# Patient Record
Sex: MALE | Race: WHITE | NOT HISPANIC OR LATINO
[De-identification: names, ages, dates, MRNs, and addresses within clinical notes are randomized per-mention and may not be internally consistent; named-entity substitution may affect disease eponyms.]

---

## 2017-01-10 ENCOUNTER — CHARTING TRANS (OUTPATIENT)
Dept: OTHER | Age: 52
End: 2017-01-10

## 2017-01-10 ASSESSMENT — PAIN SCALES - GENERAL: PAINLEVEL_OUTOF10: 0

## 2017-05-12 ENCOUNTER — INPATIENT (INPATIENT)
Facility: HOSPITAL | Age: 52
LOS: 2 days | Discharge: HOME | End: 2017-05-15
Attending: FAMILY MEDICINE | Admitting: STUDENT IN AN ORGANIZED HEALTH CARE EDUCATION/TRAINING PROGRAM
Payer: MEDICAID

## 2017-05-12 VITALS
TEMPERATURE: 98 F | DIASTOLIC BLOOD PRESSURE: 107 MMHG | SYSTOLIC BLOOD PRESSURE: 152 MMHG | WEIGHT: 190.04 LBS | HEIGHT: 73 IN | HEART RATE: 84 BPM | RESPIRATION RATE: 16 BRPM

## 2017-05-12 DIAGNOSIS — R53.1 WEAKNESS: ICD-10-CM

## 2017-05-12 LAB
ALBUMIN SERPL ELPH-MCNC: 4 G/DL — SIGNIFICANT CHANGE UP (ref 3.3–5)
ALP SERPL-CCNC: 81 U/L — SIGNIFICANT CHANGE UP (ref 40–120)
ALT FLD-CCNC: 65 U/L — SIGNIFICANT CHANGE UP (ref 12–78)
ANION GAP SERPL CALC-SCNC: 10 MMOL/L — SIGNIFICANT CHANGE UP (ref 5–17)
ANION GAP SERPL CALC-SCNC: 11 MMOL/L — SIGNIFICANT CHANGE UP (ref 5–17)
APPEARANCE UR: CLEAR — SIGNIFICANT CHANGE UP
APTT BLD: 34.1 SEC — SIGNIFICANT CHANGE UP (ref 27.5–37.4)
AST SERPL-CCNC: 35 U/L — SIGNIFICANT CHANGE UP (ref 15–37)
BACTERIA # UR AUTO: (no result)
BASE EXCESS BLDA CALC-SCNC: 1 MMOL/L — SIGNIFICANT CHANGE UP (ref -2–2)
BASOPHILS # BLD AUTO: 0.1 K/UL — SIGNIFICANT CHANGE UP (ref 0–0.2)
BASOPHILS # BLD AUTO: 0.1 K/UL — SIGNIFICANT CHANGE UP (ref 0–0.2)
BASOPHILS NFR BLD AUTO: 1 % — SIGNIFICANT CHANGE UP (ref 0–2)
BASOPHILS NFR BLD AUTO: 1.2 % — SIGNIFICANT CHANGE UP (ref 0–2)
BILIRUB SERPL-MCNC: 0.6 MG/DL — SIGNIFICANT CHANGE UP (ref 0.2–1.2)
BILIRUB UR-MCNC: NEGATIVE — SIGNIFICANT CHANGE UP
BLOOD GAS COMMENTS ARTERIAL: SIGNIFICANT CHANGE UP
BUN SERPL-MCNC: 13 MG/DL — SIGNIFICANT CHANGE UP (ref 7–23)
BUN SERPL-MCNC: 13 MG/DL — SIGNIFICANT CHANGE UP (ref 7–23)
CALCIUM SERPL-MCNC: 8.8 MG/DL — SIGNIFICANT CHANGE UP (ref 8.5–10.1)
CALCIUM SERPL-MCNC: 9.2 MG/DL — SIGNIFICANT CHANGE UP (ref 8.5–10.1)
CHLORIDE SERPL-SCNC: 104 MMOL/L — SIGNIFICANT CHANGE UP (ref 96–108)
CHLORIDE SERPL-SCNC: 106 MMOL/L — SIGNIFICANT CHANGE UP (ref 96–108)
CO2 SERPL-SCNC: 25 MMOL/L — SIGNIFICANT CHANGE UP (ref 22–31)
CO2 SERPL-SCNC: 26 MMOL/L — SIGNIFICANT CHANGE UP (ref 22–31)
COLOR SPEC: YELLOW — SIGNIFICANT CHANGE UP
CREAT SERPL-MCNC: 1.37 MG/DL — HIGH (ref 0.5–1.3)
CREAT SERPL-MCNC: 1.38 MG/DL — HIGH (ref 0.5–1.3)
DIFF PNL FLD: (no result)
EOSINOPHIL # BLD AUTO: 0.1 K/UL — SIGNIFICANT CHANGE UP (ref 0–0.5)
EOSINOPHIL # BLD AUTO: 0.2 K/UL — SIGNIFICANT CHANGE UP (ref 0–0.5)
EOSINOPHIL NFR BLD AUTO: 1.8 % — SIGNIFICANT CHANGE UP (ref 0–6)
EOSINOPHIL NFR BLD AUTO: 3.6 % — SIGNIFICANT CHANGE UP (ref 0–6)
EPI CELLS # UR: SIGNIFICANT CHANGE UP
GLUCOSE SERPL-MCNC: 105 MG/DL — HIGH (ref 70–99)
GLUCOSE SERPL-MCNC: 160 MG/DL — HIGH (ref 70–99)
GLUCOSE UR QL: NEGATIVE MG/DL — SIGNIFICANT CHANGE UP
HCO3 BLDA-SCNC: 24 MMOL/L — SIGNIFICANT CHANGE UP (ref 21–29)
HCT VFR BLD CALC: 44.9 % — SIGNIFICANT CHANGE UP (ref 39–50)
HCT VFR BLD CALC: 50 % — SIGNIFICANT CHANGE UP (ref 39–50)
HGB BLD-MCNC: 16.1 G/DL — SIGNIFICANT CHANGE UP (ref 13–17)
HGB BLD-MCNC: 17.6 G/DL — HIGH (ref 13–17)
HIV 1 & 2 AB SERPL IA.RAPID: SIGNIFICANT CHANGE UP
INR BLD: 0.9 RATIO — SIGNIFICANT CHANGE UP (ref 0.88–1.16)
KETONES UR-MCNC: NEGATIVE — SIGNIFICANT CHANGE UP
LACTATE SERPL-SCNC: 0.7 MMOL/L — SIGNIFICANT CHANGE UP (ref 0.7–2)
LEUKOCYTE ESTERASE UR-ACNC: (no result)
LYMPHOCYTES # BLD AUTO: 1.7 K/UL — SIGNIFICANT CHANGE UP (ref 1–3.3)
LYMPHOCYTES # BLD AUTO: 2.3 K/UL — SIGNIFICANT CHANGE UP (ref 1–3.3)
LYMPHOCYTES # BLD AUTO: 22.2 % — SIGNIFICANT CHANGE UP (ref 13–44)
LYMPHOCYTES # BLD AUTO: 33.9 % — SIGNIFICANT CHANGE UP (ref 13–44)
MCHC RBC-ENTMCNC: 32.7 PG — SIGNIFICANT CHANGE UP (ref 27–34)
MCHC RBC-ENTMCNC: 34.3 PG — HIGH (ref 27–34)
MCHC RBC-ENTMCNC: 35.2 GM/DL — SIGNIFICANT CHANGE UP (ref 32–36)
MCHC RBC-ENTMCNC: 35.8 GM/DL — SIGNIFICANT CHANGE UP (ref 32–36)
MCV RBC AUTO: 92.7 FL — SIGNIFICANT CHANGE UP (ref 80–100)
MCV RBC AUTO: 95.8 FL — SIGNIFICANT CHANGE UP (ref 80–100)
MONOCYTES # BLD AUTO: 0.3 K/UL — SIGNIFICANT CHANGE UP (ref 0–0.9)
MONOCYTES # BLD AUTO: 0.5 K/UL — SIGNIFICANT CHANGE UP (ref 0–0.9)
MONOCYTES NFR BLD AUTO: 4.8 % — SIGNIFICANT CHANGE UP (ref 2–14)
MONOCYTES NFR BLD AUTO: 6.5 % — SIGNIFICANT CHANGE UP (ref 2–14)
NEUTROPHILS # BLD AUTO: 3.8 K/UL — SIGNIFICANT CHANGE UP (ref 1.8–7.4)
NEUTROPHILS # BLD AUTO: 5.3 K/UL — SIGNIFICANT CHANGE UP (ref 1.8–7.4)
NEUTROPHILS NFR BLD AUTO: 56.5 % — SIGNIFICANT CHANGE UP (ref 43–77)
NEUTROPHILS NFR BLD AUTO: 68.5 % — SIGNIFICANT CHANGE UP (ref 43–77)
NITRITE UR-MCNC: NEGATIVE — SIGNIFICANT CHANGE UP
PCO2 BLDA: 36 MMHG — SIGNIFICANT CHANGE UP (ref 32–46)
PH BLDA: 7.45 — SIGNIFICANT CHANGE UP (ref 7.35–7.45)
PH UR: 6.5 — SIGNIFICANT CHANGE UP (ref 5–8)
PLATELET # BLD AUTO: 259 K/UL — SIGNIFICANT CHANGE UP (ref 150–400)
PLATELET # BLD AUTO: 310 K/UL — SIGNIFICANT CHANGE UP (ref 150–400)
PO2 BLDA: 92 — SIGNIFICANT CHANGE UP
POTASSIUM SERPL-MCNC: 3.4 MMOL/L — LOW (ref 3.5–5.3)
POTASSIUM SERPL-MCNC: 4.1 MMOL/L — SIGNIFICANT CHANGE UP (ref 3.5–5.3)
POTASSIUM SERPL-SCNC: 3.4 MMOL/L — LOW (ref 3.5–5.3)
POTASSIUM SERPL-SCNC: 4.1 MMOL/L — SIGNIFICANT CHANGE UP (ref 3.5–5.3)
PROT SERPL-MCNC: 7.6 GM/DL — SIGNIFICANT CHANGE UP (ref 6–8.3)
PROT UR-MCNC: NEGATIVE MG/DL — SIGNIFICANT CHANGE UP
PROTHROM AB SERPL-ACNC: 9.7 SEC — LOW (ref 9.8–12.7)
RAPID RVP RESULT: SIGNIFICANT CHANGE UP
RBC # BLD: 4.69 M/UL — SIGNIFICANT CHANGE UP (ref 4.2–5.8)
RBC # BLD: 5.4 M/UL — SIGNIFICANT CHANGE UP (ref 4.2–5.8)
RBC # FLD: 10.9 % — SIGNIFICANT CHANGE UP (ref 10.3–14.5)
RBC # FLD: 11.4 % — SIGNIFICANT CHANGE UP (ref 10.3–14.5)
RBC CASTS # UR COMP ASSIST: SIGNIFICANT CHANGE UP /HPF (ref 0–4)
SAO2 % BLDA: 97 — SIGNIFICANT CHANGE UP
SODIUM SERPL-SCNC: 140 MMOL/L — SIGNIFICANT CHANGE UP (ref 135–145)
SODIUM SERPL-SCNC: 142 MMOL/L — SIGNIFICANT CHANGE UP (ref 135–145)
SP GR SPEC: 1.01 — SIGNIFICANT CHANGE UP (ref 1.01–1.02)
TROPONIN I SERPL-MCNC: <0.015 NG/ML — SIGNIFICANT CHANGE UP (ref 0.01–0.04)
TSH SERPL-MCNC: 1.65 UIU/ML — SIGNIFICANT CHANGE UP (ref 0.36–3.74)
UROBILINOGEN FLD QL: NEGATIVE MG/DL — SIGNIFICANT CHANGE UP
WBC # BLD: 6.7 K/UL — SIGNIFICANT CHANGE UP (ref 3.8–10.5)
WBC # BLD: 7.7 K/UL — SIGNIFICANT CHANGE UP (ref 3.8–10.5)
WBC # FLD AUTO: 6.7 K/UL — SIGNIFICANT CHANGE UP (ref 3.8–10.5)
WBC # FLD AUTO: 7.7 K/UL — SIGNIFICANT CHANGE UP (ref 3.8–10.5)
WBC UR QL: SIGNIFICANT CHANGE UP

## 2017-05-12 PROCEDURE — 70450 CT HEAD/BRAIN W/O DYE: CPT | Mod: 26

## 2017-05-12 PROCEDURE — 99285 EMERGENCY DEPT VISIT HI MDM: CPT

## 2017-05-12 PROCEDURE — 71010: CPT | Mod: 26

## 2017-05-12 PROCEDURE — 93010 ELECTROCARDIOGRAM REPORT: CPT

## 2017-05-12 RX ORDER — ASPIRIN/CALCIUM CARB/MAGNESIUM 324 MG
81 TABLET ORAL DAILY
Qty: 0 | Refills: 0 | Status: DISCONTINUED | OUTPATIENT
Start: 2017-05-12 | End: 2017-05-15

## 2017-05-12 RX ORDER — SODIUM CHLORIDE 9 MG/ML
1000 INJECTION INTRAMUSCULAR; INTRAVENOUS; SUBCUTANEOUS ONCE
Qty: 0 | Refills: 0 | Status: COMPLETED | OUTPATIENT
Start: 2017-05-12 | End: 2017-05-12

## 2017-05-12 RX ADMIN — SODIUM CHLORIDE 2000 MILLILITER(S): 9 INJECTION INTRAMUSCULAR; INTRAVENOUS; SUBCUTANEOUS at 13:51

## 2017-05-12 RX ADMIN — Medication 81 MILLIGRAM(S): at 21:17

## 2017-05-12 NOTE — ED PROVIDER NOTE - OBJECTIVE STATEMENT
50 yo M otherwise healthy presents for fatigue x 5 days. denies fever, cough, cold symptoms, CP, SOB, unilateral weakness. Patient feels physically tired and has no energy. Last saw his PCP 3 months ago and had a normal physical. Pt states he has been taking allergy medication and has slept 15 hours in the last 2 days. No depression, SI/HI, hallucinations. Pt had flu vaccine this year. Has a desk job.

## 2017-05-12 NOTE — H&P ADULT - NSHPREVIEWOFSYSTEMS_GEN_ALL_CORE
REVIEW OF SYSTEMS:  General: NAD, hemodynamically stable, (+) weakness  CARDIOVASCULAR:  No chest pain, chest pressure or chest discomfort. No palpitations or edema.  RESPIRATORY:  No shortness of breath, cough or sputum.  GASTROINTESTINAL:  No anorexia, nausea, vomiting or diarrhea. No abdominal pain or blood.  NEUROLOGICAL:  No headache, dizziness, syncope, paralysis, ataxia, numbness or tingling in the extremities. No change in bowel or bladder control.  MUSCULOSKELETAL:  No muscle, back pain, joint pain or stiffness.

## 2017-05-12 NOTE — ED PROVIDER NOTE - NS ED MD SCRIBE ATTENDING SCRIBE SECTIONS
DISPOSITION/PHYSICAL EXAM/PAST MEDICAL/SURGICAL/SOCIAL HISTORY/HISTORY OF PRESENT ILLNESS/REVIEW OF SYSTEMS/PROGRESS NOTE/RESULTS

## 2017-05-12 NOTE — H&P ADULT - PROBLEM SELECTOR PLAN 1
- could be secondary to dehydration due to H/H - 17.6, Cr - 1.38  - HIV - (-)  - CBC / BMP / UA reviewed  - CT scan - unremarkable  - EKG - - could be secondary to dehydration due to H/H - 17.6, Cr - 1.38  - HIV - (-)  - CBC / BMP / UA reviewed  - CT scan - unremarkable  - pending TSH / iron studies - could be secondary to dehydration due to H/H - 17.6, Cr - 1.38  - HIV - (-)  - CBC / BMP / UA reviewed  - tick borne panel sent  - CT scan - unremarkable  - pending TSH / iron studies

## 2017-05-12 NOTE — H&P ADULT - HISTORY OF PRESENT ILLNESS
patient is a 52 y/o/m with extensive weakness last couple of days. Denies fever, cough, cold symptoms, CP, SOB, unilateral weakness. Patient feels physically tired and has no energy. Last saw his PCP 3 months ago and had a normal physical. Pt states he has been taking allergy medication and has slept 15 hours in the last 2 days. No depression, SI/HI, hallucinations. Pt had flu vaccine this year. 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 Denies fever, cough, cold symptoms, CP, SOB, unilateral weakness. Patient feels physically tired and has no energy. Last saw his PCP 3 months ago and had a normal physical. Pt states he has been taking allergy medication and has slept 15 hours in the last 2 days. No depression, SI/HI, hallucinations. Pt had flu vaccine this year. Has a desk job.    5/12: seen and eval. Hemodynamically stable. Patient is a 50 y/o/m w/ no past medical history presented from urgent care with extensive weakness last couple of days. Apperently, around 5-7 days ago, patient started to become extremely fatigued, with prolonged sleeping episodes (slept last 2 days). Feels that his mind is cloudy.  Denies fever, cough, cold symptoms, CP, SOB, unilateral weakness.No fevers, chills or shakes. Patient feels physically tired and has no energy. recently moved from [place] to [place]. Pt states he has been taking allergy medication - claritin for allergies. States, everyone in the family had a flue, except him.      5/12: seen and eval. Hemodynamically stable. Labs and vitals reviewed.

## 2017-05-12 NOTE — H&P ADULT - NSHPLABSRESULTS_GEN_ALL_CORE
CBC Full  -  ( 12 May 2017 13:45 )  WBC Count : 7.7 K/uL  Hemoglobin : 17.6 g/dL  Hematocrit : 50.0 %  Platelet Count - Automated : 310 K/uL    PT/INR - ( 12 May 2017 13:45 )   PT: 9.7 sec;   INR: 0.90 ratio       PTT - ( 12 May 2017 13:45 )  PTT:34.1 sec  Urinalysis Basic - ( 12 May 2017 13:47 )    Color: Yellow / Appearance: Clear / S.010 / pH: x  Gluc: x / Ketone: Negative  / Bili: Negative / Urobili: Negative mg/dL   Blood: x / Protein: Negative mg/dL / Nitrite: Negative   Leuk Esterase: Trace / RBC: 0-2 /HPF / WBC 0-2   Sq Epi: x / Non Sq Epi: Occasional / Bacteria: Occasional          142  |  106  |  13  ----------------------------<  105<H>  4.1   |  26  |  1.38<H>    Ca    9.2      12 May 2017 13:45    TPro  7.6  /  Alb  4.0  /  TBili  0.6  /  DBili  x   /  AST  35  /  ALT  65  /  AlkPhos  81

## 2017-05-12 NOTE — H&P ADULT - NSHPPHYSICALEXAM_GEN_ALL_CORE
Physical Exam:   GENERAL APPEARANCE:  NAD, hemodynamically stable  T(C): 36.8, Max: 36.8 (05-12 @ 13:52)  HR: 84 (84 - 84)  BP: 152/107 (152/107 - 152/107)  RR: 16 (16 - 16)  HEENT:  Head is normocephalic    Skin:  Warm and dry without any rash   NECK:  Supple without lymphadenopathy.   HEART:  Regular rate and rhythm. normal S1 and S2, No M/R/G  LUNGS:  Good ins/exp effort, no W/R/R/C  ABDOMEN:  Soft, nontender, nondistended with good bowel sounds heard  EXTREMITIES:  Without cyanosis, clubbing or edema.   NEUROLOGICAL:  Gross nonfocal Physical Exam:   GENERAL APPEARANCE:  NAD, hemodynamically stable, just feels tired.   T(C): 36.8, Max: 36.8 (05-12 @ 13:52)  HR: 84 (84 - 84)  BP: 152/107 (152/107 - 152/107)  RR: 16 (16 - 16)  HEENT:  Head is normocephalic    Skin:  Warm and dry without any rash   NECK:  Supple without lymphadenopathy.   HEART:  Regular rate and rhythm. normal S1 and S2, No M/R/G  LUNGS:  Good ins/exp effort, no W/R/R/C  ABDOMEN:  Soft, nontender, nondistended with good bowel sounds heard  EXTREMITIES:  Without cyanosis, clubbing or edema.   NEUROLOGICAL:  Gross nonfocal, 5/5 motor strength, able to ambulate. Physical Exam:   GENERAL APPEARANCE:  NAD, hemodynamically stable, just feels tired.   T(C): 36.8, Max: 36.8 (05-12 @ 13:52)  HR: 84 (84 - 84)  BP: 152/107 (152/107 - 152/107)  RR: 16 (16 - 16)  HEENT:  Head is normocephalic    Skin:  Warm and dry without any rash   NECK:  Supple without lymphadenopathy.   HEART:  Regular rate and rhythm. normal S1 and S2, No M/R/G  LUNGS:  Good ins/exp effort, no W/R/R/C  ABDOMEN:  Soft, nontender, nondistended with good bowel sounds heard  EXTREMITIES:  Without cyanosis, clubbing or edema. good motor strength.   NEUROLOGICAL:  Gross nonfocal, 5/5 motor strength, able to ambulate.

## 2017-05-12 NOTE — ED PROVIDER NOTE - MUSCULOSKELETAL, MLM
Compartment soft. Spine appears normal, range of motion is not limited, no muscle or joint tenderness

## 2017-05-12 NOTE — ED ADULT NURSE NOTE - CHIEF COMPLAINT QUOTE
Patient reports weakness, dizziness and shortness of breath. Was sent in by his MD. Patient hasn't been feeling good the last few days. Thought it was allergies. Patient went to a walk in clinic and they called an ambulanace

## 2017-05-13 DIAGNOSIS — I10 ESSENTIAL (PRIMARY) HYPERTENSION: ICD-10-CM

## 2017-05-13 DIAGNOSIS — R42 DIZZINESS AND GIDDINESS: ICD-10-CM

## 2017-05-13 DIAGNOSIS — E86.0 DEHYDRATION: ICD-10-CM

## 2017-05-13 DIAGNOSIS — F32.9 MAJOR DEPRESSIVE DISORDER, SINGLE EPISODE, UNSPECIFIED: ICD-10-CM

## 2017-05-13 LAB
B BURGDOR C6 AB SER-ACNC: NEGATIVE — SIGNIFICANT CHANGE UP
B BURGDOR IGG+IGM SER-ACNC: 0.08 INDEX — SIGNIFICANT CHANGE UP (ref 0.01–0.89)
CK SERPL-CCNC: 109 U/L — SIGNIFICANT CHANGE UP (ref 26–308)
CULTURE RESULTS: NO GROWTH — SIGNIFICANT CHANGE UP
IRON SATN MFR SERPL: 108 UG/DL — SIGNIFICANT CHANGE UP (ref 45–165)
SPECIMEN SOURCE: SIGNIFICANT CHANGE UP
TROPONIN I SERPL-MCNC: <0.015 NG/ML — SIGNIFICANT CHANGE UP (ref 0.01–0.04)
TROPONIN I SERPL-MCNC: <0.015 NG/ML — SIGNIFICANT CHANGE UP (ref 0.01–0.04)

## 2017-05-13 PROCEDURE — 93010 ELECTROCARDIOGRAM REPORT: CPT

## 2017-05-13 PROCEDURE — 99223 1ST HOSP IP/OBS HIGH 75: CPT

## 2017-05-13 PROCEDURE — 93306 TTE W/DOPPLER COMPLETE: CPT | Mod: 26

## 2017-05-13 RX ORDER — SODIUM CHLORIDE 9 MG/ML
1000 INJECTION INTRAMUSCULAR; INTRAVENOUS; SUBCUTANEOUS
Qty: 0 | Refills: 0 | Status: DISCONTINUED | OUTPATIENT
Start: 2017-05-13 | End: 2017-05-14

## 2017-05-13 RX ORDER — LANOLIN ALCOHOL/MO/W.PET/CERES
3 CREAM (GRAM) TOPICAL AT BEDTIME
Qty: 0 | Refills: 0 | Status: DISCONTINUED | OUTPATIENT
Start: 2017-05-13 | End: 2017-05-15

## 2017-05-13 RX ADMIN — SODIUM CHLORIDE 100 MILLILITER(S): 9 INJECTION INTRAMUSCULAR; INTRAVENOUS; SUBCUTANEOUS at 13:05

## 2017-05-13 RX ADMIN — Medication 81 MILLIGRAM(S): at 10:41

## 2017-05-13 RX ADMIN — Medication 3 MILLIGRAM(S): at 20:57

## 2017-05-13 NOTE — PROGRESS NOTE ADULT - SUBJECTIVE AND OBJECTIVE BOX
HOSPITALIST PROGRESS NOTE:  SUBJECTIVE:  PCP:   Chief Complaint: Patient is a 51y old  Male who presents with a chief complaint of - weakness (12 May 2017 15:55)      HPI:  Patient is a 50 y/o/m w/ no past medical history presented from urgent care with extensive weakness last couple of days. Apperently, around 5-7 days ago, patient started to become extremely fatigued, with prolonged sleeping episodes (slept last 2 days). Feels that his mind is cloudy.  Denies fever, cough, cold symptoms, CP, SOB, unilateral weakness.No fevers, chills or shakes. Patient feels physically tired and has no energy. recently moved from [place] to [place]. Pt states he has been taking allergy medication - claritin for allergies. States, everyone in the family had a flue, except him.      : seen and eval. Hemodynamically stable. Labs and vitals reviewed. (12 May 2017 15:55)      Allergies:  No Known Allergies    REVIEW OF SYSTEMS:  See HPI. All other review of systems is negative unless indicated above.     OBJECTIVE  Physical Exam:  Vital Signs:  Height (cm): 185.4 (05-12 @ 12:43)  Weight (kg): 86.2 (05-12 @ 12:43)  BMI (kg/m2): 25.1 (05-12 @ 12:43)  BSA (m2): 2.11 (05-12 @ 12:43)  Vital Signs Last 24 Hrs  T(C): 36.9, Max: 36.9 (05-13 @ 05:06)  T(F): 98.4, Max: 98.4 (05-13 @ 05:06)  HR: 76 (76 - 98)  BP: 130/90 (130/90 - 152/107)  BP(mean): --  RR: 18 (16 - 18)  SpO2: 100% (97% - 100%)  I&O's Summary      Constitutional: NAD, awake and alert, well-developed  Neurological: AAO x 3, no focal deficits  HEENT: PERRLA, EOMI, MMM  Neck: Soft and supple, No LAD, No JVD  Respiratory: Breath sounds are clear bilaterally, No wheezing, rales or rhonchi  Cardiovascular: S1 and S2, regular rate and rhythm; no Murmurs, gallops or rubs  Gastrointestinal: Bowel Sounds present, soft, nontender, nondistended, no guarding, no rebound tenderness  Back: No CVA tenderness   Extremities: No peripheral edema  Vascular: 2+ peripheral pulses  Musculoskeletal: 5/5 strength b/l upper and lower extremities  Skin: No rashes  Breast: Deferred  Rectal: Deferred    MEDICATIONS  (STANDING):  aspirin  chewable 81milliGRAM(s) Oral daily      LABS: All Labs Reviewed:                        16.1   6.7   )-----------( 259      ( 12 May 2017 20:09 )             44.9         140  |  104  |  13  ----------------------------<  160<H>  3.4<L>   |  25  |  1.37<H>    Ca    8.8      12 May 2017 20:09    TPro  7.6  /  Alb  4.0  /  TBili  0.6  /  DBili  x   /  AST  35  /  ALT  65  /  AlkPhos  81  12    PT/INR - ( 12 May 2017 13:45 )   PT: 9.7 sec;   INR: 0.90 ratio         PTT - ( 12 May 2017 13:45 )  PTT:34.1 sec  CARDIAC MARKERS ( 12 May 2017 13:45 )  <0.015 ng/mL / x     / x     / x     / x            Urinalysis Basic - ( 12 May 2017 13:47 )    Color: Yellow / Appearance: Clear / S.010 / pH: x  Gluc: x / Ketone: Negative  / Bili: Negative / Urobili: Negative mg/dL   Blood: x / Protein: Negative mg/dL / Nitrite: Negative   Leuk Esterase: Trace / RBC: 0-2 /HPF / WBC 0-2   Sq Epi: x / Non Sq Epi: Occasional / Bacteria: Occasional        Blood Culture:       RADIOLOGY/EKG:    EXAM:  CHEST SINGLE VIEW FRONTAL         2017      The lungs are clear.  No pleural abnormality is seen.      Cardiomediastinal silhouette is intact. HOSPITALIST PROGRESS NOTE:  SUBJECTIVE:  PCP: None    Chief Complaint: Patient is a 51y old  Male who presents with a chief complaint of - weakness (12 May 2017 15:55)    HPI:  Patient is a 52 y/o/m w/ no past medical history presented from urgent care with extensive weakness last couple of days. Apperently, around 5-7 days ago, patient started to become extremely fatigued, with prolonged sleeping episodes (slept last 2 days). Feels that his mind is cloudy.  Denies fever, cough, cold symptoms, CP, SOB, unilateral weakness.No fevers, chills or shakes. Patient feels physically tired and has no energy. recently moved from [place] to [place]. Pt states he has been taking allergy medication - claritin for allergies. States, everyone in the family had a flue, except him.    : seen and eval. Hemodynamically stable. Labs and vitals reviewed. (12 May 2017 15:55)    :  continues to feel weak but improving. Dizziness and CP resolved but still not back to his normal self.     Allergies:  No Known Allergies    REVIEW OF SYSTEMS:  See HPI. All other review of systems is negative unless indicated above.     OBJECTIVE  Physical Exam:  Vital Signs:  Height (cm): 185.4 (- @ 12:43)  Weight (kg): 86.2 (- @ 12:43)  BMI (kg/m2): 25.1 (- @ 12:43)  BSA (m2): 2.11 (- @ 12:43)  Vital Signs Last 24 Hrs  T(C): 36.9, Max: 36.9 ( @ 05:06)  T(F): 98.4, Max: 98.4 ( @ 05:06)  HR: 76 (76 - 98)  BP: 130/90 (130/90 - 152/107)  BP(mean): --  RR: 18 (16 - 18)  SpO2: 100% (97% - 100%)  I&O's Summary      Constitutional: NAD, awake and alert, well-developed  Neurological: AAO x 3, no focal deficits  HEENT: PERRLA, EOMI, MMM  Neck: Soft and supple, No LAD, No JVD  Respiratory: Breath sounds are clear bilaterally, No wheezing, rales or rhonchi  Cardiovascular: S1 and S2, regular rate and rhythm; no Murmurs, gallops or rubs  Gastrointestinal: Bowel Sounds present, soft, nontender, nondistended, no guarding, no rebound tenderness  Back: No CVA tenderness   Extremities: No peripheral edema  Vascular: 2+ peripheral pulses  Musculoskeletal: 5/5 strength b/l upper and lower extremities  Skin: No rashes  Breast: Deferred  Rectal: Deferred    MEDICATIONS  (STANDING):  aspirin  chewable 81milliGRAM(s) Oral daily      LABS: All Labs Reviewed:                        16.1   6.7   )-----------( 259      ( 12 May 2017 20:09 )             44.9         140  |  104  |  13  ----------------------------<  160<H>  3.4<L>   |  25  |  1.37<H>    Ca    8.8      12 May 2017 20:09    TPro  7.6  /  Alb  4.0  /  TBili  0.6  /  DBili  x   /  AST  35  /  ALT  65  /  AlkPhos  81  12    PT/INR - ( 12 May 2017 13:45 )   PT: 9.7 sec;   INR: 0.90 ratio         PTT - ( 12 May 2017 13:45 )  PTT:34.1 sec  CARDIAC MARKERS ( 12 May 2017 13:45 )  <0.015 ng/mL / x     / x     / x     / x            Urinalysis Basic - ( 12 May 2017 13:47 )    Color: Yellow / Appearance: Clear / S.010 / pH: x  Gluc: x / Ketone: Negative  / Bili: Negative / Urobili: Negative mg/dL   Blood: x / Protein: Negative mg/dL / Nitrite: Negative   Leuk Esterase: Trace / RBC: 0-2 /HPF / WBC 0-2   Sq Epi: x / Non Sq Epi: Occasional / Bacteria: Occasional        Blood Culture:       RADIOLOGY/EKG:    EXAM:  CHEST SINGLE VIEW FRONTAL         2017      The lungs are clear.  No pleural abnormality is seen.      Cardiomediastinal silhouette is intact.    EKG- NSR

## 2017-05-13 NOTE — CONSULT NOTE ADULT - PROBLEM SELECTOR RECOMMENDATION 4
multifactorial appears to be related to depression , dehydration ? viral illness , may be related to his uncontrolled hypertensive episode , which is improved with iv fluid , improved BP ,   continue ecotrin ,

## 2017-05-13 NOTE — CONSULT NOTE ADULT - SUBJECTIVE AND OBJECTIVE BOX
PCP:    REQUESTING PHYSICIAN:    REASON FOR CONSULT: HTN dizziness     CHIEF COMPLAINT: foggy feeling ,fatigue ,felt unsteady gait     HPI:  Patient is a 52 y/o male with hx of depression  who came off  medications him self   who was not feeling well for last few days with fatigue , sleepy , was sleeping for long hours after his work , patient felt foggy ? dizziness ,constant , felt like unsteady on his feet with mild shortness of breath without chest pain  when he was walking to his car , went to urgent care  patient was told to have non specific Ekg changes , [facility]  noted noted that his blood pressure  155/111/ without fever or chills ,  saturation 98% .     Feels that his mind is cloudy.  Denies fever, cough, cold symptoms, CP, SOB, unilateral weakness.No fevers, chills or shakes. Patient feels physically tired and has no energy. recently moved from [place] to [place]. Pt states he has been taking allergy medication - claritin for allergies. States, everyone in the family had a flue, except him.    Patient is poor historian , can not give full details , Patient says his dizziness was constant , did not change with any position , resolved this morning , his blood pressure improved           PAST MEDICAL & SURGICAL HISTORY:  depression for many years , stopped taking medication for last 2 months     hernia surgery ,   diverticula resection   SBO       Allergies    No Known Allergies    Intolerances        SOCIAL HISTORY: never smoker , alcohol    FAMILY HISTORY: no hx of premature CAD       MEDICATIONS:  MEDICATIONS  (STANDING):  aspirin  chewable 81milliGRAM(s) Oral daily  sodium chloride 0.9%. 1000milliLiter(s) IV Continuous <Continuous>    MEDICATIONS  (PRN):      REVIEW OF SYSTEMS:    as above  All other review of systems is negative unless indicated above    Vital Signs Last 24 Hrs  T(C): 36.9, Max: 36.9 (05-13 @ 05:06)  T(F): 98.4, Max: 98.4 (05-13 @ 05:06)  HR: 76 (76 - 98)  BP: 130/90 (130/90 - 152/107)  BP(mean): --  RR: 18 (16 - 18)  SpO2: 100% (97% - 100%)    I&O's Summary      PHYSICAL EXAM:    Constitutional: NAD, awake and alert, well-developed  but appears depressed though he denies   HEENT: PERR, EOMI,  No oral cyananosis.  Neck:  supple,  No JVD  Respiratory: Breath sounds are clear bilaterally, No wheezing, rales or rhonchi  Cardiovascular: S1 and S2, regular rate and rhythm, no Murmurs, gallops or rubs  Gastrointestinal: Bowel Sounds present, soft, nontender.   Extremities: No peripheral edema. No clubbing or cyanosis.  Vascular: 2+ peripheral pulses  Neurological: A/O x 3, no focal deficits  Musculoskeletal: no calf tenderness.  Skin: No rashes.      LABS: All Labs Reviewed:                        16.1   6.7   )-----------( 259      ( 12 May 2017 20:09 )             44.9                         17.6   7.7   )-----------( 310      ( 12 May 2017 13:45 )             50.0     12 May 2017 20:09    140    |  104    |  13     ----------------------------<  160    3.4     |  25     |  1.37   12 May 2017 13:45    142    |  106    |  13     ----------------------------<  105    4.1     |  26     |  1.38     Ca    8.8        12 May 2017 20:09  Ca    9.2        12 May 2017 13:45    TPro  7.6    /  Alb  4.0    /  TBili  0.6    /  DBili  x      /  AST  35     /  ALT  65     /  AlkPhos  81     12 May 2017 13:45    PT/INR - ( 12 May 2017 13:45 )   PT: 9.7 sec;   INR: 0.90 ratio         PTT - ( 12 May 2017 13:45 )  PTT:34.1 sec  CARDIAC MARKERS ( 12 May 2017 13:45 )  <0.015 ng/mL / x     / x     / x     / x          Blood Culture:     - @ 20:09  TSH: 1.650      RADIOLOGY/EK2017  Normal sinus rhythm  Nonspecific T wave abnormality  Abnormal ECG  No previous ECGs available  Confirmed by ARSH RODRIGUEZ MD ([id number]) on 2017 9:44:46 AM      CT head  2017    There is no evidence of intraparenchymal or extraaxial hemorrhage.     There is no CT evidence of large vessel acute infarct. No mass effect is   found in the brain.  No evidence of midline shift or herniation pattern.    The ventricles, sulci and basal cisterns appear unremarkable.         Visualized paranasal sinuses are clear.      IMPRESSION:       No acute intracranial findings.

## 2017-05-13 NOTE — CONSULT NOTE ADULT - PROBLEM SELECTOR RECOMMENDATION 2
initially had elevated blood pressure , now improving , and dizziness was resolved   continue monitor his blood pressure , will obtain echocardiogram to rule out LVH ,   if BP elevated with evidence of LVH , would start him on medication

## 2017-05-14 LAB
ANION GAP SERPL CALC-SCNC: 7 MMOL/L — SIGNIFICANT CHANGE UP (ref 5–17)
BUN SERPL-MCNC: 18 MG/DL — SIGNIFICANT CHANGE UP (ref 7–23)
CALCIUM SERPL-MCNC: 8.7 MG/DL — SIGNIFICANT CHANGE UP (ref 8.5–10.1)
CHLORIDE SERPL-SCNC: 110 MMOL/L — HIGH (ref 96–108)
CK SERPL-CCNC: 102 U/L — SIGNIFICANT CHANGE UP (ref 26–308)
CO2 SERPL-SCNC: 26 MMOL/L — SIGNIFICANT CHANGE UP (ref 22–31)
CREAT SERPL-MCNC: 1.2 MG/DL — SIGNIFICANT CHANGE UP (ref 0.5–1.3)
GLUCOSE SERPL-MCNC: 96 MG/DL — SIGNIFICANT CHANGE UP (ref 70–99)
HCT VFR BLD CALC: 43.8 % — SIGNIFICANT CHANGE UP (ref 39–50)
HGB BLD-MCNC: 15.2 G/DL — SIGNIFICANT CHANGE UP (ref 13–17)
MAGNESIUM SERPL-MCNC: 2.3 MG/DL — SIGNIFICANT CHANGE UP (ref 1.6–2.6)
MCHC RBC-ENTMCNC: 34.4 PG — HIGH (ref 27–34)
MCHC RBC-ENTMCNC: 34.8 GM/DL — SIGNIFICANT CHANGE UP (ref 32–36)
MCV RBC AUTO: 98.8 FL — SIGNIFICANT CHANGE UP (ref 80–100)
PHOSPHATE SERPL-MCNC: 3.8 MG/DL — SIGNIFICANT CHANGE UP (ref 2.5–4.5)
PLATELET # BLD AUTO: 222 K/UL — SIGNIFICANT CHANGE UP (ref 150–400)
POTASSIUM SERPL-MCNC: 3.9 MMOL/L — SIGNIFICANT CHANGE UP (ref 3.5–5.3)
POTASSIUM SERPL-SCNC: 3.9 MMOL/L — SIGNIFICANT CHANGE UP (ref 3.5–5.3)
RBC # BLD: 4.43 M/UL — SIGNIFICANT CHANGE UP (ref 4.2–5.8)
RBC # FLD: 11.9 % — SIGNIFICANT CHANGE UP (ref 10.3–14.5)
SODIUM SERPL-SCNC: 143 MMOL/L — SIGNIFICANT CHANGE UP (ref 135–145)
WBC # BLD: 6.6 K/UL — SIGNIFICANT CHANGE UP (ref 3.8–10.5)
WBC # FLD AUTO: 6.6 K/UL — SIGNIFICANT CHANGE UP (ref 3.8–10.5)

## 2017-05-14 PROCEDURE — 99233 SBSQ HOSP IP/OBS HIGH 50: CPT

## 2017-05-14 RX ORDER — LOSARTAN POTASSIUM 100 MG/1
50 TABLET, FILM COATED ORAL DAILY
Qty: 0 | Refills: 0 | Status: DISCONTINUED | OUTPATIENT
Start: 2017-05-14 | End: 2017-05-15

## 2017-05-14 RX ORDER — POTASSIUM CHLORIDE 20 MEQ
40 PACKET (EA) ORAL ONCE
Qty: 0 | Refills: 0 | Status: COMPLETED | OUTPATIENT
Start: 2017-05-14 | End: 2017-05-14

## 2017-05-14 RX ORDER — ACETAMINOPHEN 500 MG
650 TABLET ORAL EVERY 6 HOURS
Qty: 0 | Refills: 0 | Status: DISCONTINUED | OUTPATIENT
Start: 2017-05-14 | End: 2017-05-15

## 2017-05-14 RX ADMIN — Medication 3 MILLIGRAM(S): at 21:52

## 2017-05-14 RX ADMIN — LOSARTAN POTASSIUM 50 MILLIGRAM(S): 100 TABLET, FILM COATED ORAL at 09:55

## 2017-05-14 RX ADMIN — Medication 40 MILLIEQUIVALENT(S): at 12:14

## 2017-05-14 RX ADMIN — Medication 81 MILLIGRAM(S): at 11:25

## 2017-05-14 RX ADMIN — Medication 650 MILLIGRAM(S): at 16:22

## 2017-05-14 NOTE — BEHAVIORAL HEALTH ASSESSMENT NOTE - HPI (INCLUDE ILLNESS QUALITY, SEVERITY, DURATION, TIMING, CONTEXT, MODIFYING FACTORS, ASSOCIATED SIGNS AND SYMPTOMS)
Patient admits to 10 year history of depression, taking several antidepressants, states they work well for a while than then stop. Patients reports the last medication was Cymbalta 30 mg which he stopped 2 months ago.  Patient admits he got the medication from a PCP and has not seen a psychiatrist for some time.  Patient denies suicidal thoughts, plans or intentions, he denies previous inpatient psychiatric treatments.  He denies alcohol use disorder signs and symptoms, some alcohol use.  Patient denies hallucinations, delusions, history of adelina.  He does report his mood is worse in the winter.  Patient admits history of ambien use for insomnia, not recent or long term use.

## 2017-05-14 NOTE — BEHAVIORAL HEALTH ASSESSMENT NOTE - PAST PSYCHOTROPIC MEDICATION
has been on multiple SSRI and other antidepressants, most recent Cymbalta, has been on wellbutrin in the past

## 2017-05-14 NOTE — CONSULT NOTE ADULT - SUBJECTIVE AND OBJECTIVE BOX
Neurology Consult requested by:   Patient is a 51y old  Male who presents with a chief complaint of - weakness (12 May 2017 15:55)     HPI:  Patient is a 52 y/o/m w/ no past medical history presented from urgent care with extensive weakness last couple of days. Apperently, around 5-7 days ago, patient started to become extremely fatigued, with prolonged sleeping episodes (slept last 2 days). Feels that his mind is cloudy.  Denies fever, cough, cold symptoms, CP, SOB, unilateral weakness.No fevers, chills or shakes. Patient feels physically tired and has no energy. recently moved from [place] to [place]. Pt states he has been taking allergy medication - claritin for allergies. States, everyone in the family had a flue, except him.      5/12: seen and eval. Hemodynamically stable. Labs and vitals reviewed. (12 May 2017 15:55)      PAST MEDICAL & SURGICAL HISTORY:  No pertinent past medical history    FAMILY HISTORY:    Social Hx:  Nonsmoker, no drug or alcohol use  Medications and Allergies ReviewedMEDICATIONS  (STANDING):  aspirin  chewable 81milliGRAM(s) Oral daily  losartan 50milliGRAM(s) Oral daily     ROS: Pertinent positives in HPI, all other ROS were reviewed and are negative.      Examination:   Vital Signs Last 24 Hrs  T(C): 36.8, Max: 37.3 (05-13 @ 13:03)  T(F): 98.3, Max: 99.1 (05-13 @ 13:03)  HR: 80 (80 - 95)  BP: 122/87 (122/87 - 142/98)  BP(mean): --  RR: 16 (15 - 16)  SpO2: 94% (94% - 97%)  General: Cooperative, NAD   NECK: supple, no masses  ENT: Normal hearing   Vascular : no carotid bruits,   Lungs: CTAB  Chest: RRR, no murmurs  Extremities: nontender, no edema  Musculoskeletal: no adventitious movements, no joint stiffness  Skin: no rash    Neurological Examination:  NIHSS:  MS: AOx3. Appropriately interactive, normal affect. Speech fluent w/o paraphasic error, repetition, naming, reading is intact   CN: VFFTC, PERLL, EOMI, V1-3 sensation intact, face symmetric, hearing intact, palate elevates symmetrically, tongue midline, SCM equal bilaterally  Motor: normal bulk and tone, no tremor, rigidity or bradykinesia.  5/5 all over   Sens: Intact to light touch.    Reflexes:1/4 all over, downgoing toes b/l  Coord:  No dysmetria, VINITA intact   Gait: normla    Labs;  Complete Blood Count (05.14.17 @ 05:34)    WBC Count: 6.6 K/uL    RBC Count: 4.43 M/uL    Hemoglobin: 15.2 g/dL    Hematocrit: 43.8 %    Mean Cell Volume: 98.8 fl    Mean Cell Hemoglobin: 34.4 pg    Mean Cell Hemoglobin Conc: 34.8 gm/dL    Red Cell Distrib Width: 11.9 %    Platelet Count - Automated: 222 K/uL      Basic Metabolic Panel (05.14.17 @ 05:34)    Sodium, Serum: 143 mmol/L    Potassium, Serum: 3.9 mmol/L    Chloride, Serum: 110 mmol/L    Carbon Dioxide, Serum: 26 mmol/L    Anion Gap, Serum: 7 mmol/L    Blood Urea Nitrogen, Serum: 18 mg/dL    Creatinine, Serum: 1.20 mg/dL    Glucose, Serum: 96 mg/dL    Calcium, Total Serum: 8.7 mg/dL    eGFR if Non : 69: Interpretative comment    Imaging:   Ct of head: Findings:       There is no evidence of intraparenchymal or extraaxial hemorrhage.     There is no CT evidence of large vessel acute infarct. No mass effect is   found in the brain.  No evidence of midline shift or herniation pattern.    The ventricles, sulci and basal cisterns appear unremarkable.         Visualized paranasal sinuses are clear.      IMPRESSION:       No acute intracranial findings.

## 2017-05-14 NOTE — BEHAVIORAL HEALTH ASSESSMENT NOTE - SUMMARY
Patient is a 51 year old  male with a 10+ history of mild to moderate depression with multiple trails of antidepressants, most used in low dose and stopped after initial response wears off.  Possible seasonal component.  Patient currently complaints of significant weakness, and insomnia, but is sleeping during the day. Patient denies suicidal or homicidal thoughts, plans or intent. Patient does not need 5N or constant observation. He is in need of referral to outpatient psychiatric mental health specialist, [facility] is recommended ( referral done). [facility] treatment  webpage also provided. Patient would benefit from antidepressant medication, wellbutrin or cymbalta at higher dose was discussed but not started.

## 2017-05-14 NOTE — BEHAVIORAL HEALTH ASSESSMENT NOTE - PROBLEM SELECTOR PLAN 1
Psychoeducation regarding effective treatment for depression, referral to out patient mental health services ([facility]).  Antidepressants recommended, Wellbutrin or Cymbalta have had some response in the past.

## 2017-05-14 NOTE — CONSULT NOTE ADULT - PROBLEM SELECTOR RECOMMENDATION 9
Patient with prior hx of depression who stopped medication 2 months with extreme fatigue without fever , mostly multifactorial ,depression ,dehydration ,  encourage to  increase fluid intake ,  would recommend psych evaluation
supportive care

## 2017-05-14 NOTE — PROGRESS NOTE ADULT - SUBJECTIVE AND OBJECTIVE BOX
REASON FOR CONSULT: HTN dizziness     CHIEF COMPLAINT: foggy feeling ,fatigue ,felt unsteady gait     HPI:  Patient is a 50 y/o male with hx of depression  who came off  medications him self   who was not feeling well for last few days with fatigue , sleepy , was sleeping for long hours after his work , patient felt foggy ? dizziness ,constant , felt like unsteady on his feet with mild shortness of breath without chest pain  when he was walking to his car , went to urgent care  patient was told to have non specific Ekg changes , [facility]  noted noted that his blood pressure  155/111/ without fever or chills ,  saturation 98% .     Feels that his mind is cloudy.  Denies fever, cough, cold symptoms, CP, SOB, unilateral weakness.No fevers, chills or shakes. Patient feels physically tired and has no energy. recently moved from [place] to [place]. Pt states he has been taking allergy medication - claritin for allergies. States, everyone in the family had a flue, except him.    Patient is poor historian , can not give full details , Patient says his dizziness was constant , did not change with any position , resolved this morning , his blood pressure improved   2017  Patient is feeling better , still feel tired  heart rate is stable , mild elevated diastolic pressure ?          PAST MEDICAL & SURGICAL HISTORY:  depression for many years , stopped taking medication for last 2 months     hernia surgery ,   diverticula resection   SBO       Allergies    No Known Allergies    Intolerances    MEDICATIONS  (STANDING):  aspirin  chewable 81milliGRAM(s) Oral daily  sodium chloride 0.9%. 1000milliLiter(s) IV Continuous <Continuous>  sodium chloride 0.9%. 1000milliLiter(s) IV Continuous <Continuous>    MEDICATIONS  (PRN):  melatonin 3milliGRAM(s) Oral at bedtime PRN Insomnia            Vital Signs Last 24 Hrs  T(C): 36.7, Max: 37.3 (05-13 @ 13:03)  T(F): 98.1, Max: 99.1 (05-13 @ 13:03)  HR: 88 (88 - 103)  BP: 142/98 (142/90 - 147/97)  BP(mean): --  RR: 15 (15 - 18)  SpO2: 96% (96% - 97%)      PHYSICAL EXAM:    Constitutional: NAD, awake and alert, well-developed  but appears depressed though he denies   HEENT: PERR, EOMI,  No oralcyanosis  Neck:  supple,  No JVD  Respiratory: Breath sounds are clear bilaterally, No wheezing, rales or rhonchi  Cardiovascular: S1 and S2, regular rate and rhythm, no Murmurs, gallops or rubs  Gastrointestinal: Bowel Sounds present, soft, nontender.   Extremities: No peripheral edema. No clubbing or cyanosis.  Vascular: 2+ peripheral pulses  Neurological: A/O x 3, no focal deficits  Musculoskeletal: no calf tenderness.  Skin: No rashes.      LABS: All Labs Reviewed:                                       15.2   6.6   )-----------( 222      ( 14 May 2017 05:34 )             43.8     05-14    143  |  110<H>  |  18  ----------------------------<  96  3.9   |  26  |  1.20    Ca    8.7      14 May 2017 05:34  Phos  3.8     05-14  Mg     2.3     05-14    TPro  7.6  /  Alb  4.0  /  TBili  0.6  /  DBili  x   /  AST  35  /  ALT  65  /  AlkPhos  81  05-12    CARDIAC MARKERS ( 14 May 2017 05:34 )  x     / x     / 102 U/L / x     / x      CARDIAC MARKERS ( 13 May 2017 20:47 )  <0.015 ng/mL / x     / x     / x     / x      CARDIAC MARKERS ( 13 May 2017 10:24 )  <0.015 ng/mL / x     / 109 U/L / x     / x      CARDIAC MARKERS ( 12 May 2017 13:45 )  <0.015 ng/mL / x     / x     / x     / x          LIVER FUNCTIONS - ( 12 May 2017 13:45 )  Alb: 4.0 g/dL / Pro: 7.6 gm/dL / ALK PHOS: 81 U/L / ALT: 65 U/L / AST: 35 U/L / GGT: x           PT/INR - ( 12 May 2017 13:45 )   PT: 9.7 sec;   INR: 0.90 ratio         PTT - ( 12 May 2017 13:45 )  PTT:34.1 sec  Urinalysis Basic - ( 12 May 2017 13:47 )    Color: Yellow / Appearance: Clear / S.010 / pH: x  Gluc: x / Ketone: Negative  / Bili: Negative / Urobili: Negative mg/dL   Blood: x / Protein: Negative mg/dL / Nitrite: Negative   Leuk Esterase: Trace / RBC: 0-2 /HPF / WBC 0-2   Sq Epi: x / Non Sq Epi: Occasional / Bacteria: Occasional        RADIOLOGY/EK/12/2017  Normal sinus rhythm  Nonspecific T wave abnormality  Abnormal ECG  No previous ECGs available  Confirmed by ARSH RODRIGUEZ MD ([id number]) on 2017 9:44:46 AM    2017 normal sinus rhythm non specificT changes       monitor 2017 sinus rhythm       CT head  2017    There is no evidence of intraparenchymal or extraaxial hemorrhage.     There is no CT evidence of large vessel acute infarct. No mass effect is   found in the brain.  No evidence of midline shift or herniation pattern.    The ventricles, sulci and basal cisterns appear unremarkable.         Visualized paranasal sinuses are clear.      IMPRESSION:       No acute intracranial findings.

## 2017-05-14 NOTE — BEHAVIORAL HEALTH ASSESSMENT NOTE - DETAILS
none available Patient's father has Parkinson's disease and has become depressed since its onset. weakness

## 2017-05-14 NOTE — BEHAVIORAL HEALTH ASSESSMENT NOTE - FAMILY DETAILS
Lives now in [place] with his parents, his son and ex-wife are in [place], where he goes back and forth from.

## 2017-05-14 NOTE — PROGRESS NOTE ADULT - SUBJECTIVE AND OBJECTIVE BOX
HOSPITALIST PROGRESS NOTE:  SUBJECTIVE:  PCP: None    Chief Complaint: Patient is a 51y old  Male who presents with a chief complaint of - weakness (12 May 2017 15:55)    HPI:  Patient is a 52 y/o/m w/ no past medical history presented from urgent care with extensive weakness last couple of days. Apperently, around 5-7 days ago, patient started to become extremely fatigued, with prolonged sleeping episodes (slept last 2 days). Feels that his mind is cloudy.  Denies fever, cough, cold symptoms, CP, SOB, unilateral weakness.No fevers, chills or shakes. Patient feels physically tired and has no energy. recently moved from [place] to [place]. Pt states he has been taking allergy medication - claritin for allergies. States, everyone in the family had a flue, except him.    : seen and eval. Hemodynamically stable. Labs and vitals reviewed. (12 May 2017 15:55)    :  continues to feel weak but improving. Dizziness and CP resolved but still not back to his normal self.   : No alarms on tele. Dizziness resolved. Feels better then when he came in. Seen by cardio started on losartan. No other issues.     Allergies:  No Known Allergies    REVIEW OF SYSTEMS:  See HPI. All other review of systems is negative unless indicated above.     OBJECTIVE  Physical Exam:  ICU Vital Signs Last 24 Hrs  T(C): 36.8, Max: 37.3 (-13 @ 13:03)  T(F): 98.3, Max: 99.1 (05-13 @ 13:03)  HR: 80 (80 - 103)  BP: 122/87 (122/87 - 147/97)  BP(mean): --  ABP: --  ABP(mean): --  RR: 16 (15 - 18)  SpO2: 94% (94% - 97%)      Constitutional: NAD, awake and alert, well-developed  Neurological: AAO x 3, no focal deficits  HEENT: PERRLA, EOMI, MMM  Neck: Soft and supple, No LAD, No JVD  Respiratory: Breath sounds are clear bilaterally, No wheezing, rales or rhonchi  Cardiovascular: S1 and S2, regular rate and rhythm; no Murmurs, gallops or rubs  Gastrointestinal: Bowel Sounds present, soft, nontender, nondistended, no guarding, no rebound tenderness  Back: No CVA tenderness   Extremities: No peripheral edema  Vascular: 2+ peripheral pulses  Musculoskeletal: 5/5 strength b/l upper and lower extremities  Skin: No rashes  Breast: Deferred  Rectal: Deferred    MEDICATIONS  (STANDING):  aspirin  chewable 81milliGRAM(s) Oral daily    Lab Results:  CBC  CBC Full  -  ( 14 May 2017 05:34 )  WBC Count : 6.6 K/uL  Hemoglobin : 15.2 g/dL  Hematocrit : 43.8 %  Platelet Count - Automated : 222 K/uL  Mean Cell Volume : 98.8 fl  Mean Cell Hemoglobin : 34.4 pg  Mean Cell Hemoglobin Concentration : 34.8 gm/dL  Auto Neutrophil # : x  Auto Lymphocyte # : x  Auto Monocyte # : x  Auto Eosinophil # : x  Auto Basophil # : x  Auto Neutrophil % : x  Auto Lymphocyte % : x  Auto Monocyte % : x  Auto Eosinophil % : x  Auto Basophil % : x    .		Differential:	[] Automated		[] Manual  Chemistry                        15.2   6.6   )-----------( 222      ( 14 May 2017 05:34 )             43.8     05-14    143  |  110<H>  |  18  ----------------------------<  96  3.9   |  26  |  1.20    Ca    8.7      14 May 2017 05:34  Phos  3.8     05-14  Mg     2.3     05-14    TPro  7.6  /  Alb  4.0  /  TBili  0.6  /  DBili  x   /  AST  35  /  ALT  65  /  AlkPhos  81  05-12    LIVER FUNCTIONS - ( 12 May 2017 13:45 )  Alb: 4.0 g/dL / Pro: 7.6 gm/dL / ALK PHOS: 81 U/L / ALT: 65 U/L / AST: 35 U/L / GGT: x           PT/INR - ( 12 May 2017 13:45 )   PT: 9.7 sec;   INR: 0.90 ratio         PTT - ( 12 May 2017 13:45 )  PTT:34.1 sec  Urinalysis Basic - ( 12 May 2017 13:47 )    Color: Yellow / Appearance: Clear / S.010 / pH: x  Gluc: x / Ketone: Negative  / Bili: Negative / Urobili: Negative mg/dL   Blood: x / Protein: Negative mg/dL / Nitrite: Negative   Leuk Esterase: Trace / RBC: 0-2 /HPF / WBC 0-2   Sq Epi: x / Non Sq Epi: Occasional / Bacteria: Occasional    ABG - ( 12 May 2017 17:34 )  pH: 7.45  /  pCO2: 36    /  pO2: 92    / HCO3: 24    / Base Excess: 1     /  SaO2: 97        MICROBIOLOGY/CULTURES:  Culture Results:   No growth to date. ( @ 13:47)  Culture Results:   No growth ( @ 13:47)  Culture Results:   No growth to date. ( @ 13:45)      RADIOLOGY/EKG:    EXAM:  CHEST SINGLE VIEW FRONTAL         2017      The lungs are clear.  No pleural abnormality is seen.      Cardiomediastinal silhouette is intact.    EKG- NSR      EXAM:  CT BRAIN     2017  IMPRESSION:       No acute intracranial findings.

## 2017-05-15 VITALS
RESPIRATION RATE: 16 BRPM | SYSTOLIC BLOOD PRESSURE: 128 MMHG | DIASTOLIC BLOOD PRESSURE: 89 MMHG | TEMPERATURE: 98 F | OXYGEN SATURATION: 98 % | HEART RATE: 91 BPM

## 2017-05-15 LAB
ANION GAP SERPL CALC-SCNC: 8 MMOL/L — SIGNIFICANT CHANGE UP (ref 5–17)
BUN SERPL-MCNC: 16 MG/DL — SIGNIFICANT CHANGE UP (ref 7–23)
CALCIUM SERPL-MCNC: 8.6 MG/DL — SIGNIFICANT CHANGE UP (ref 8.5–10.1)
CHLORIDE SERPL-SCNC: 111 MMOL/L — HIGH (ref 96–108)
CO2 SERPL-SCNC: 25 MMOL/L — SIGNIFICANT CHANGE UP (ref 22–31)
CREAT SERPL-MCNC: 1.06 MG/DL — SIGNIFICANT CHANGE UP (ref 0.5–1.3)
GLUCOSE SERPL-MCNC: 100 MG/DL — HIGH (ref 70–99)
MAGNESIUM SERPL-MCNC: 2.2 MG/DL — SIGNIFICANT CHANGE UP (ref 1.6–2.6)
PHOSPHATE SERPL-MCNC: 3.9 MG/DL — SIGNIFICANT CHANGE UP (ref 2.5–4.5)
POTASSIUM SERPL-MCNC: 3.9 MMOL/L — SIGNIFICANT CHANGE UP (ref 3.5–5.3)
POTASSIUM SERPL-SCNC: 3.9 MMOL/L — SIGNIFICANT CHANGE UP (ref 3.5–5.3)
SODIUM SERPL-SCNC: 144 MMOL/L — SIGNIFICANT CHANGE UP (ref 135–145)

## 2017-05-15 PROCEDURE — 99232 SBSQ HOSP IP/OBS MODERATE 35: CPT

## 2017-05-15 RX ORDER — LOSARTAN POTASSIUM 100 MG/1
1 TABLET, FILM COATED ORAL
Qty: 30 | Refills: 0
Start: 2017-05-15 | End: 2017-06-14

## 2017-05-15 RX ADMIN — Medication 81 MILLIGRAM(S): at 11:44

## 2017-05-15 RX ADMIN — LOSARTAN POTASSIUM 50 MILLIGRAM(S): 100 TABLET, FILM COATED ORAL at 05:11

## 2017-05-15 NOTE — PROGRESS NOTE ADULT - PROBLEM SELECTOR PLAN 2
initially had elevated blood pressure , now improving , and dizziness was resolved   continue monitor his blood pressure , Normal ventricular function  ,    will add losartan 50 mg po daily , monitor BP , encourage low salt diet  ,
initially had elevated blood pressure , now improving , and dizziness was resolved   continue monitor his blood pressure , will obtain echocardiogram to rule out LVH ,    will add losartan 50 mg po daily , monitor ,

## 2017-05-15 NOTE — DISCHARGE NOTE ADULT - CARE PROVIDERS DIRECT ADDRESSES
[email or web address]

## 2017-05-15 NOTE — DISCHARGE NOTE ADULT - CARE PROVIDER_API CALL
Co, Huang TIERNEY), Internal Medicine; Pulmonary Disease  [address] [place] [address]  Phone: [phone]  Fax: [phone]    Palla, Venugopal R (MD), Cardiovascular Disease; Internal Medicine  [address] [place] [address]  Phone: [phone]  Fax: [phone]

## 2017-05-15 NOTE — DISCHARGE NOTE ADULT - MEDICATION SUMMARY - MEDICATIONS TO TAKE
I will START or STAY ON the medications listed below when I get home from the hospital:    losartan 50 mg oral tablet  -- 1 tab(s) by mouth once a day  -- Indication: For Hypertension

## 2017-05-15 NOTE — DISCHARGE NOTE ADULT - PATIENT PORTAL LINK FT
“You can access the [employer] Patient Portal, offered by [facility], by registering with the following website: [email or web address]”

## 2017-05-15 NOTE — DISCHARGE NOTE ADULT - HOME CARE AGENCY
Appointment, Wednesday May 31, 2017  10:15am with Dr. Williamson @ [facility]  [address], [phone] Appointment, Wednesday May 31, 2017  10:15am with Dr. Williamson @ [facility]  [address], [phone]--**$50 FEE REQUIRED**

## 2017-05-15 NOTE — PROGRESS NOTE ADULT - ASSESSMENT
*Dizziness and Atypical CP ACS ruled out  -Dizziness most likely 2ndry to Viral syndrome  -tele - No alarms  -troponin X 3 neg  -Cardio consult appreciated  -ECHO Estimated left ventricular ejection fraction is 58 %  -Neurology consult appreciated    -continue ASA    *Weakness and ARF 2ndry to Dehydration  -HIV, Lyme, CT head, UA, RVP -All neg  -S/P IVF  -PT evaluation  -TSH/Iron Studies - WNL    - HIV - (-)  - CBC / BMP / UA reviewed  - CT scan - unremarkable  - pending TSH / iron studies  *Fatigue due to depression.   -Patient with prior hx of depression who stopped his medication 3 months with extreme fatigue   -Psych consult appreciated  - No psych contraindications for discharge,  FU for appointment with mental health services - [facility]  	  *HTN  -continue to monitor  -Started on Losartan 50mg Po QD    *DVT ppx  SCD's and Ambulation    *Discharge Instructions/ follow up/Pending labs:  -D/C patient home on losartan.  FU with [facility] to establish a PCP to monitor BP  -FU with mental health services at [facility]    Message left with [facility]  Total time: 45 minutes .
*Dizziness and Atypical CP ACS ruled out  -Dizziness most likely 2ndry to Viral syndrome  -tele - No alarms  -troponin X 3 neg  -Cardio consult appreciated  -FU ECHO  -Neurology consult  -continue ASA    *Weakness and ARF 2ndry to Dehydration  -HIV, Lyme, CT head, UA, RVP -All neg  -S/P IVF  -PT evaluation  -TSH/Iron Studies - WNL    - HIV - (-)  - CBC / BMP / UA reviewed  - CT scan - unremarkable  - pending TSH / iron studies  *Fatigue due to depression.   -Patient with prior hx of depression who stopped his medication 3 months with extreme fatigue   -Psych consult appreciated  - No psych contraindications for discharge, SW FU for appointmenet with mental health services - [facility]  	  *HTN  -continue to monitor  -Started on Losartan 50mg Po QD  -FU ECHO    *DVT ppx  SCD's and Ambulation
*Dizziness and Atypical CP R/O ACS  -Dizziness most likely 2ndry to Viral syndrome  -Transfer to tele  -Serial troponin and EKG  -Cardio consult appreciated  -FU ECHO  -Neurology consult  -continue ASA    *Weakness and ARF 2ndry to Dehydration  -HIV, Lyme, CT head, UA, RVP -All neg  -continue IVF  -neuro checks   -PT evaluation  -pending TSH / iron studies- could be secondary to dehydration due to H/H - 17.6, Cr - 1.38  - HIV - (-)  - CBC / BMP / UA reviewed  - CT scan - unremarkable  - pending TSH / iron studies  *Fatigue due to depression.   -Patient with prior hx of depression who stopped his medication 3 months with extreme fatigue   -psych evaluation.    *HTN  -continue to monitor  -May need start meds   -FU ECHO

## 2017-05-15 NOTE — PROGRESS NOTE ADULT - SUBJECTIVE AND OBJECTIVE BOX
REASON FOR CONSULT: HTN dizziness     CHIEF COMPLAINT: foggy feeling ,fatigue ,felt unsteady gait     HPI:  Patient is a 52 y/o male with hx of depression  who came off  medications him self   who was not feeling well for last few days with fatigue , sleepy , was sleeping for long hours after his work , patient felt foggy ? dizziness ,constant , felt like unsteady on his feet with mild shortness of breath without chest pain  when he was walking to his car , went to urgent care  patient was told to have non specific Ekg changes , [facility]  noted noted that his blood pressure  155/111/ without fever or chills ,  saturation 98% .     Feels that his mind is cloudy.  Denies fever, cough, cold symptoms, CP, SOB, unilateral weakness.No fevers, chills or shakes. Patient feels physically tired and has no energy. recently moved from [place] to [place]. Pt states he has been taking allergy medication - claritin for allergies. States, everyone in the family had a flue, except him.    Patient is poor historian , can not give full details , Patient says his dizziness was constant , did not change with any position , resolved this morning , his blood pressure improved   2017  Patient is feeling better , still feel tired  heart rate is stable , mild elevated diastolic pressure ?    5/15/2017 Patient is feeling much better , denies any chest pain or shortness of breath ,b lood pressure is improving , monitor sinus rhythm     PAST MEDICAL & SURGICAL HISTORY:  depression for many years , stopped taking medication for last 2 months     hernia surgery ,   diverticula resection   SBO       Allergies    No Known Allergies    Intolerances    MEDICATIONS  (STANDING):  aspirin  chewable 81milliGRAM(s) Oral daily  losartan 50milliGRAM(s) Oral daily    MEDICATIONS  (PRN):  melatonin 3milliGRAM(s) Oral at bedtime PRN Insomnia  acetaminophen   Tablet 650milliGRAM(s) Oral every 6 hours PRN For Temp greater than 38 C (100.4 F)        Vital Signs Last 24 Hrs  T(C): 37.1, Max: 37.1 (-15 @ 04:37)  T(F): 98.7, Max: 98.7 (05-15 @ 04:37)  HR: 96 (92 - 96)  BP: 140/87 (122/71 - 140/87)  BP(mean): --  RR: 17 (16 - 17)  SpO2: 100% (99% - 100%)    I&O's Summary    I & Os for current day (as of 15 May 2017 10:30)  =============================================  IN: 1000 ml / OUT: 0 ml / NET: 1000 ml      PHYSICAL EXAM:    Constitutional: NAD, awake and alert, well-developed  but appears depressed though he denies   HEENT: PERR, EOMI,  No oralcyanosis  Neck:  supple,  No JVD  Respiratory: Breath sounds are clear bilaterally, No wheezing, rales or rhonchi  Cardiovascular: S1 and S2, regular rate and rhythm, no Murmurs, gallops or rubs  Gastrointestinal: Bowel Sounds present, soft, nontender.   Extremities: No peripheral edema. No clubbing or cyanosis.  Vascular: 2+ peripheral pulses  Neurological: A/O x 3, no focal deficits  Musculoskeletal: no calf tenderness.  Skin: No rashes.      LABS: All Labs Reviewed:                                       15.2   6.6   )-----------( 222      ( 14 May 2017 05:34 )             43.8     05-14    143  |  110<H>  |  18  ----------------------------<  96  3.9   |  26  |  1.20    Ca    8.7      14 May 2017 05:34  Phos  3.8     05-14  Mg     2.3     05-14    TPro  7.6  /  Alb  4.0  /  TBili  0.6  /  DBili  x   /  AST  35  /  ALT  65  /  AlkPhos  81  05-12    CARDIAC MARKERS ( 14 May 2017 05:34 )  x     / x     / 102 U/L / x     / x      CARDIAC MARKERS ( 13 May 2017 20:47 )  <0.015 ng/mL / x     / x     / x     / x      CARDIAC MARKERS ( 13 May 2017 10:24 )  <0.015 ng/mL / x     / 109 U/L / x     / x      CARDIAC MARKERS ( 12 May 2017 13:45 )  <0.015 ng/mL / x     / x     / x     / x          LIVER FUNCTIONS - ( 12 May 2017 13:45 )  Alb: 4.0 g/dL / Pro: 7.6 gm/dL / ALK PHOS: 81 U/L / ALT: 65 U/L / AST: 35 U/L / GGT: x           PT/INR - ( 12 May 2017 13:45 )   PT: 9.7 sec;   INR: 0.90 ratio         PTT - ( 12 May 2017 13:45 )  PTT:34.1 sec  Urinalysis Basic - ( 12 May 2017 13:47 )    Color: Yellow / Appearance: Clear / S.010 / pH: x  Gluc: x / Ketone: Negative  / Bili: Negative / Urobili: Negative mg/dL   Blood: x / Protein: Negative mg/dL / Nitrite: Negative   Leuk Esterase: Trace / RBC: 0-2 /HPF / WBC 0-2   Sq Epi: x / Non Sq Epi: Occasional / Bacteria: Occasional        RADIOLOGY/EK/12/2017  Normal sinus rhythm  Nonspecific T wave abnormality  Abnormal ECG  No previous ECGs available  Confirmed by ARSH RODRIGUEZ MD ([id number]) on 2017 9:44:46 AM    2017 normal sinus rhythm non specificT changes       monitor 2017 sinus rhythm       CT head  2017    There is no evidence of intraparenchymal or extraaxial hemorrhage.     There is no CT evidence of large vessel acute infarct. No mass effect is   found in the brain.  No evidence of midline shift or herniation pattern.    The ventricles, sulci and basal cisterns appear unremarkable.         Visualized paranasal sinuses are clear.      IMPRESSION:       No acute intracranial findings.    Echo      Summary     Left ventricular wall motion is normal. The left ventricle is normal in   size.   Estimated left ventricular ejection fraction is 58 % via bi-plane method.   The left atrium is normal.   The right atrium is normal.   The right ventricle is normal in size, wall thickness, wall motion, and   contractility.   The aortic valve is well visualized, appears normal. Valve opening seems   to be normal.   The mitral valve was well visualized. The mitral valve leaflets appear   thin and normal.   The tricuspid valve leaflets are thin and pliable; valve motion is   normal.   No evidence of pericardial effusion.     Signature     ----------------------------------------------------------------   Electronically signed by Hayden Anderson MD(Interpreting   physician) on 2017 02:13 PM   ----------------------------------------------------------------

## 2017-05-15 NOTE — DISCHARGE NOTE ADULT - HOSPITAL COURSE
HOSPITALIST PROGRESS NOTE:  SUBJECTIVE:  PCP: None    Chief Complaint: Patient is a 51y old  Male who presents with a chief complaint of - weakness (12 May 2017 15:55)    HPI:  Patient is a 52 y/o/m w/ no past medical history presented from urgent care with extensive weakness last couple of days. Apperently, around 5-7 days ago, patient started to become extremely fatigued, with prolonged sleeping episodes (slept last 2 days). Feels that his mind is cloudy.  Denies fever, cough, cold symptoms, CP, SOB, unilateral weakness.No fevers, chills or shakes. Patient feels physically tired and has no energy. recently moved from [place] to [place]. Pt states he has been taking allergy medication - claritin for allergies. States, everyone in the family had a flue, except him.    5/12: seen and eval. Hemodynamically stable. Labs and vitals reviewed. (12 May 2017 15:55)    5/13:  continues to feel weak but improving. Dizziness and CP resolved but still not back to his normal self.   5/14: No alarms on tele. Dizziness resolved. Feels better then when he came in. Seen by cardio started on losartan. No other issues.   5/15: Seen by Neuro and no need for further work up. Patient feels better and would like to go home. Patient ambulated yesterday, no dizziness or any alarms on tele.  *Dizziness and Atypical CP ACS ruled out  -Dizziness most likely 2ndry to Viral syndrome  -tele - No alarms  -troponin X 3 neg  -Cardio consult appreciated  -ECHO Estimated left ventricular ejection fraction is 58 %  -Neurology consult appreciated    -continue ASA    *Weakness and ARF 2ndry to Dehydration  -HIV, Lyme, CT head, UA, RVP -All neg  -S/P IVF  -PT evaluation  -TSH/Iron Studies - WNL    - HIV - (-)  - CBC / BMP / UA reviewed  - CT scan - unremarkable  - pending TSH / iron studies  *Fatigue due to depression.   -Patient with prior hx of depression who stopped his medication 3 months with extreme fatigue   -Psych consult appreciated  - No psych contraindications for discharge, SW FU for appointment with mental health services - [facility]  	  *HTN  -continue to monitor  -Started on Losartan 50mg Po QD    *DVT ppx  SCD's and Ambulation    *Discharge Instructions/ follow up/Pending labs:  -D/C patient home on losartan.  FU with [facility] to establish a PCP to monitor BP  -FU with mental health services at [facility]    Message left with [facility]  Total time: 45 minutes .

## 2017-05-15 NOTE — DISCHARGE NOTE ADULT - COMMUNITY RESOURCES
Provided list of Mental Health Agencies available Provided list of Community Resources & Mental Health Agencies available

## 2017-05-15 NOTE — PROGRESS NOTE ADULT - SUBJECTIVE AND OBJECTIVE BOX
HOSPITALIST PROGRESS NOTE:  SUBJECTIVE:  PCP: None    Chief Complaint: Patient is a 51y old  Male who presents with a chief complaint of - weakness (12 May 2017 15:55)    HPI:  Patient is a 52 y/o/m w/ no past medical history presented from urgent care with extensive weakness last couple of days. Apperently, around 5-7 days ago, patient started to become extremely fatigued, with prolonged sleeping episodes (slept last 2 days). Feels that his mind is cloudy.  Denies fever, cough, cold symptoms, CP, SOB, unilateral weakness.No fevers, chills or shakes. Patient feels physically tired and has no energy. recently moved from [place] to [place]. Pt states he has been taking allergy medication - claritin for allergies. States, everyone in the family had a flue, except him.    : seen and eval. Hemodynamically stable. Labs and vitals reviewed. (12 May 2017 15:55)    :  continues to feel weak but improving. Dizziness and CP resolved but still not back to his normal self.   : No alarms on tele. Dizziness resolved. Feels better then when he came in. Seen by cardio started on losartan. No other issues.   5/15: Seen by Neuro and no need for further work up. Patient feels better and would like to go home. Patient ambulated yesterday, no dizziness or any alarms on tele.    Allergies:  No Known Allergies    REVIEW OF SYSTEMS:  See HPI. All other review of systems is negative unless indicated above.     OBJECTIVE  Physical Exam:  ICU Vital Signs Last 24 Hrs  T(C): 37.1, Max: 37.1 (05-15 @ 04:37)  T(F): 98.7, Max: 98.7 (05-15 @ 04:37)  HR: 96 (80 - 96)  BP: 140/87 (122/71 - 140/87)  BP(mean): --  ABP: --  ABP(mean): --  RR: 17 (16 - 17)  SpO2: 100% (94% - 100%)    Constitutional: NAD, awake and alert, well-developed  Neurological: AAO x 3, no focal deficits  HEENT: PERRLA, EOMI, MMM  Neck: Soft and supple, No LAD, No JVD  Respiratory: Breath sounds are clear bilaterally, No wheezing, rales or rhonchi  Cardiovascular: S1 and S2, regular rate and rhythm; no Murmurs, gallops or rubs  Gastrointestinal: Bowel Sounds present, soft, nontender, nondistended, no guarding, no rebound tenderness  Back: No CVA tenderness   Extremities: No peripheral edema  Vascular: 2+ peripheral pulses  Musculoskeletal: 5/5 strength b/l upper and lower extremities  Skin: No rashes  Breast: Deferred  Rectal: Deferred    MEDICATIONS  (STANDING):  aspirin  chewable 81milliGRAM(s) Oral daily    Lab Results:  CBC  CBC Full  -  ( 14 May 2017 05:34 )  WBC Count : 6.6 K/uL  Hemoglobin : 15.2 g/dL  Hematocrit : 43.8 %  Platelet Count - Automated : 222 K/uL  Mean Cell Volume : 98.8 fl  Mean Cell Hemoglobin : 34.4 pg  Mean Cell Hemoglobin Concentration : 34.8 gm/dL  Auto Neutrophil # : x  Auto Lymphocyte # : x  Auto Monocyte # : x  Auto Eosinophil # : x  Auto Basophil # : x  Auto Neutrophil % : x  Auto Lymphocyte % : x  Auto Monocyte % : x  Auto Eosinophil % : x  Auto Basophil % : x    .		Differential:	[] Automated		[] Manual  Chemistry                        15.2   6.6   )-----------( 222      ( 14 May 2017 05:34 )             43.8     05-15    144  |  111<H>  |  16  ----------------------------<  100<H>  3.9   |  25  |  1.06    Ca    8.6      15 May 2017 07:22  Phos  3.9     05-15  Mg     2.2     05-15    MICROBIOLOGY/CULTURES:  Culture Results:   No growth to date. ( @ 13:47)  Culture Results:   No growth ( @ 13:47)  Culture Results:   No growth to date. ( @ 13:45)      Urinalysis Basic - ( 12 May 2017 13:47 )    Color: Yellow / Appearance: Clear / S.010 / pH: x  Gluc: x / Ketone: Negative  / Bili: Negative / Urobili: Negative mg/dL   Blood: x / Protein: Negative mg/dL / Nitrite: Negative   Leuk Esterase: Trace / RBC: 0-2 /HPF / WBC 0-2   Sq Epi: x / Non Sq Epi: Occasional / Bacteria: Occasional    ABG - ( 12 May 2017 17:34 )  pH: 7.45  /  pCO2: 36    /  pO2: 92    / HCO3: 24    / Base Excess: 1     /  SaO2: 97        MICROBIOLOGY/CULTURES:  Culture Results:   No growth to date. ( @ 13:47)  Culture Results:   No growth ( @ 13:47)  Culture Results:   No growth to date. ( @ 13:45)      RADIOLOGY/EKG:    EXAM:  CHEST SINGLE VIEW FRONTAL         2017      The lungs are clear.  No pleural abnormality is seen.      Cardiomediastinal silhouette is intact.    EKG- NSR      EXAM:  CT BRAIN     2017  IMPRESSION:       No acute intracranial findings.

## 2017-05-15 NOTE — DISCHARGE NOTE ADULT - CARE PLAN
Principal Discharge DX:	Dizziness, nonspecific  Goal:	-Dizziness most likely 2ndry to Viral syndrome  Instructions for follow-up, activity and diet:	keep hydrated. If worsening dizziness, CP advise to return to the ER  Goal:	HTN  Instructions for follow-up, activity and diet:	Started on losartan 50mg Po QD. FU with [facility] to establish a PCP to monitor BP  Goal:	depression.  Instructions for follow-up, activity and diet:	-FU with mental health services at [facility] Principal Discharge DX:	Dizziness, nonspecific  Goal:	-Dizziness most likely 2ndry to Viral syndrome  Instructions for follow-up, activity and diet:	keep hydrated. If worsening dizziness, CP advise to return to the ER  Goal:	HTN  Instructions for follow-up, activity and diet:	Started on losartan 50mg Po QD. FU with [facility] to establish a PCP to monitor BP  Goal:	depression.  Instructions for follow-up, activity and diet:	-FU with mental health services at [facility] Principal Discharge DX:	Dizziness, nonspecific  Goal:	-Dizziness most likely 2ndry to Viral syndrome  Instructions for follow-up, activity and diet:	keep hydrated. If worsening dizziness, CP advise to return to the ER  Goal:	HTN  Instructions for follow-up, activity and diet:	Started on losartan 50mg Po QD. FU with [facility] to establish a PCP to monitor BP  Goal:	depression.  Instructions for follow-up, activity and diet:	-FU with mental health services at [facility] Principal Discharge DX:	Dizziness, nonspecific  Goal:	-Dizziness most likely 2ndry to Viral syndrome  Instructions for follow-up, activity and diet:	keep hydrated. If worsening dizziness, CP advise to return to the ER  Goal:	HTN  Instructions for follow-up, activity and diet:	Started on losartan 50mg Po QD. FU with [facility] to establish a PCP to monitor BP  Goal:	depression.  Instructions for follow-up, activity and diet:	-FU with mental health services at [facility]

## 2017-05-15 NOTE — DISCHARGE NOTE ADULT - PLAN OF CARE
-Dizziness most likely 2ndry to Viral syndrome keep hydrated. If worsening dizziness, CP advise to return to the ER HTN Started on losartan 50mg Po QD. FU with [facility] to establish a PCP to monitor BP -FU with mental health services at [facility] depression.

## 2017-05-15 NOTE — DISCHARGE NOTE ADULT - ADDITIONAL INSTRUCTIONS
-D/C patient home on losartan.  FU with [facility] to establish a PCP to monitor BP  -FU with mental health services at [facility]

## 2017-05-15 NOTE — PROGRESS NOTE ADULT - PROBLEM SELECTOR PLAN 1
- could be secondary to dehydration due to H/H - 17.6, Cr - 1.38  - CT scan - unremarkable  - HIV - (-)  - tick borne panel sent  - pending TSH / iron studies
Patient with prior hx of depression who stopped medication 2 months with extreme fatigue without fever , mostly multifactorial ,depression ,dehydration ,  encourage to  increase fluid intake ,  would recommend psych evaluation.  does not appears to be cardiac , however recommend exercise stress test as OP ,   patient was explained
Patient with prior hx of depression who stopped medication 2 months with extreme fatigue without fever , mostly multifactorial ,depression ,dehydration ,  encourage to  increase fluid intake ,  would recommend psych evaluation.

## 2017-05-15 NOTE — PROGRESS NOTE ADULT - PROBLEM SELECTOR PLAN 4
multifactorial appears to be related to depression , dehydration ? viral illness , may be related to his uncontrolled hypertensive episode , improving which is improved with iv fluid , ,   continue ecotrin ,.
multifactorial appears to be related to depression , dehydration ? viral illness , may be related to his uncontrolled hypertensive episode , improving which is improved with iv fluid , ,   continue ecotrin ,.

## 2017-05-15 NOTE — DISCHARGE NOTE ADULT - OTHER SIGNIFICANT FINDINGS
Lab Results:  CBC  CBC Full  -  ( 14 May 2017 05:34 )  WBC Count : 6.6 K/uL  Hemoglobin : 15.2 g/dL  Hematocrit : 43.8 %  Platelet Count - Automated : 222 K/uL  Mean Cell Volume : 98.8 fl  Mean Cell Hemoglobin : 34.4 pg  Mean Cell Hemoglobin Concentration : 34.8 gm/dL  Auto Neutrophil # : x  Auto Lymphocyte # : x  Auto Monocyte # : x  Auto Eosinophil # : x  Auto Basophil # : x  Auto Neutrophil % : x  Auto Lymphocyte % : x  Auto Monocyte % : x  Auto Eosinophil % : x  Auto Basophil % : x    .		Differential:	[] Automated		[] Manual  Chemistry                        15.2   6.6   )-----------( 222      ( 14 May 2017 05:34 )             43.8     05-15    144  |  111<H>  |  16  ----------------------------<  100<H>  3.9   |  25  |  1.06    Ca    8.6      15 May 2017 07:22  Phos  3.9     -15  Mg     2.2     05-15    MICROBIOLOGY/CULTURES:  Culture Results:   No growth to date. ( @ 13:47)  Culture Results:   No growth ( @ 13:47)  Culture Results:   No growth to date. ( @ 13:45)      Urinalysis Basic - ( 12 May 2017 13:47 )    Color: Yellow / Appearance: Clear / S.010 / pH: x  Gluc: x / Ketone: Negative  / Bili: Negative / Urobili: Negative mg/dL   Blood: x / Protein: Negative mg/dL / Nitrite: Negative   Leuk Esterase: Trace / RBC: 0-2 /HPF / WBC 0-2   Sq Epi: x / Non Sq Epi: Occasional / Bacteria: Occasional    ABG - ( 12 May 2017 17:34 )  pH: 7.45  /  pCO2: 36    /  pO2: 92    / HCO3: 24    / Base Excess: 1     /  SaO2: 97        MICROBIOLOGY/CULTURES:  Culture Results:   No growth to date. ( @ 13:47)  Culture Results:   No growth ( @ 13:47)  Culture Results:   No growth to date. ( @ 13:45)      RADIOLOGY/EKG:    EXAM:  CHEST SINGLE VIEW FRONTAL         2017      The lungs are clear.  No pleural abnormality is seen.      Cardiomediastinal silhouette is intact.    EKG- NSR      EXAM:  CT BRAIN     2017  IMPRESSION:       No acute intracranial findings.

## 2017-05-16 LAB
A PHAGOCYTOPH IGG TITR SER IF: SIGNIFICANT CHANGE UP
A PHAGOCYTOPH IGG TITR SER IF: SIGNIFICANT CHANGE UP
A PHAGOCYTOPH IGM TITR SER IF: SIGNIFICANT CHANGE UP
B MICROTI DNA BLD QL NAA+PROBE: NEGATIVE — SIGNIFICANT CHANGE UP
BABESIA DNA SPEC QL NAA+PROBE: NEGATIVE — SIGNIFICANT CHANGE UP
BABESIA DNA SPEC QL NAA+PROBE: NEGATIVE — SIGNIFICANT CHANGE UP
E CHAFFEENSIS IGG TITR SER: SIGNIFICANT CHANGE UP
E CHAFFEENSIS IGM TITR SER IF: SIGNIFICANT CHANGE UP
EHRLICHIA AB TITR SER: SIGNIFICANT CHANGE UP

## 2017-05-17 LAB
CULTURE RESULTS: SIGNIFICANT CHANGE UP
CULTURE RESULTS: SIGNIFICANT CHANGE UP
SPECIMEN SOURCE: SIGNIFICANT CHANGE UP
SPECIMEN SOURCE: SIGNIFICANT CHANGE UP

## 2017-05-18 DIAGNOSIS — E86.0 DEHYDRATION: ICD-10-CM

## 2017-05-18 DIAGNOSIS — I10 ESSENTIAL (PRIMARY) HYPERTENSION: ICD-10-CM

## 2017-05-18 DIAGNOSIS — B34.9 VIRAL INFECTION, UNSPECIFIED: ICD-10-CM

## 2017-05-18 DIAGNOSIS — F32.9 MAJOR DEPRESSIVE DISORDER, SINGLE EPISODE, UNSPECIFIED: ICD-10-CM

## 2017-05-18 DIAGNOSIS — R53.1 WEAKNESS: ICD-10-CM

## 2017-05-19 ENCOUNTER — CHARTING TRANS (OUTPATIENT)
Dept: OTHER | Age: 52
End: 2017-05-19

## 2017-07-26 NOTE — BEHAVIORAL HEALTH ASSESSMENT NOTE - BEHAVIOR
Cooperative Principal Discharge DX:	 delivery delivered  Goal:	follow up with own ob/gyn in 2 weeks. no sex, pelvic rest, no heavy lifting  Instructions for follow-up, activity and diet:	drink plenty of fluids

## 2018-09-21 NOTE — BEHAVIORAL HEALTH ASSESSMENT NOTE - HYGIENE
Coming in for a physical and check up on his thyroid  Ailyn Mcknight LPN on 9/21/2018 at 8:14 AM     Good

## 2018-11-03 VITALS
BODY MASS INDEX: 27.36 KG/M2 | HEART RATE: 90 BPM | WEIGHT: 202 LBS | SYSTOLIC BLOOD PRESSURE: 122 MMHG | HEIGHT: 72 IN | DIASTOLIC BLOOD PRESSURE: 76 MMHG | OXYGEN SATURATION: 97 % | RESPIRATION RATE: 16 BRPM | TEMPERATURE: 98.4 F

## 2018-11-05 VITALS
BODY MASS INDEX: 28.04 KG/M2 | HEIGHT: 72 IN | OXYGEN SATURATION: 98 % | DIASTOLIC BLOOD PRESSURE: 82 MMHG | TEMPERATURE: 99.2 F | HEART RATE: 116 BPM | SYSTOLIC BLOOD PRESSURE: 128 MMHG | RESPIRATION RATE: 18 BRPM | WEIGHT: 207 LBS

## 2019-08-12 NOTE — ED ADULT NURSE NOTE - FALL HARM RISK TYPE OF ASSESSMENT
----- Message from Mary Waldrop DO sent at 8/11/2019  7:13 PM EDT -----  Can you please call this patient and tell her she has Bacterial Vaginosis and Trichomonas and an RX has been sent to her pharmacy on file? The patient and her partner will both need treatment and both need tests of cure before they resume unprotected intercourse.     Thank you so much,    Andie Youngblood DO Admission

## 2019-09-03 NOTE — ED PROVIDER NOTE - NSCAREINITIATED _GEN_ER
[facility]  Discharge Summary      Patient Name: Ruth Lan  MRN: [id number]  Admission Date: 8/12/2019  Hospital Length of Stay: 22 days  Discharge Date and Time:  09/03/2019 9:07 AM  Attending Physician: Tracie Farley MD   Discharging Provider: Cali Chun NP  Primary Care Provider: Florentino Brar MD      HPI:   Ms Lan is an 83 y.o. female with sig pmhx of chronic AF on Eliquis s/p DCCV 12/2018, HFpEF, HTN, AS s/p AVR 2004, severe MR, CKD4, HTN, Arthritis,HLD and hx R breast cancer who presents to SNF s/p hospitalization for Atrial fibrillation with RVR with LAUREEN/successful DCCV 8/9/19 by Dr. Randall. The patient initially presented to arrhythmia clinic for AF w/ RVR HR 140s, generalized weakness, shortness of breath, and multiple falls. The patient denies having chest pain or palpitations.      PEx  Constitutional: Patient appears well-developed and in no distress   HENT:  Head: Normocephalic and atraumatic.   Eyes: Pupils are equal, round, and reactive to light.   Neck: Normal range of motion. Neck supple.   Cardiovascular: Normal rate, regular rhythm and normal heart sounds.    Pulmonary/Chest: Effort normal and breath sounds are clear  Abdominal: Soft. Bowel sounds are normal.   Musculoskeletal: Normal range of motion. + generalized weakness.  Neurological: + Alert and oriented to person and place  Psychiatric: Normal mood and affect. Behavior is normal.   Wounds/Skin:   Left lower arm skin tear  Appearance of wound:  Open to air, 100% dry tan/brown eschar, irregular edges  Wound length: 1 cm   Wound width: 1 cm  Following: This NP weekly- last observed on  9/03/2019.    * No surgery found *      Hospital Course:   Patient progressed well with PT and OT. Patient had no significant events during their stay at SNF. Home health was ordered. DME was ordered if needed. Follow up appointment to be made by patient within one week. All prescriptions and  discharge instructions were ordered to be given to patient prior to discharge.       Consults:   Consults (From admission, onward)        Status Ordering Provider     Inpatient consult to Registered Dietitian/Nutritionist  Once     Provider:  (Not yet assigned)    ELLYN Kathleen          No new Assessment & Plan notes have been filed under this hospital service since the last note was generated.  Service: Hospital Medicine    Final Active Diagnoses:    Diagnosis Date Noted POA    PRINCIPAL PROBLEM:  Atrial fibrillation status post cardioversion [I48.91] 12/18/2018 Yes    Skin tear of left upper arm without complication [S41.112A] 08/14/2019 Yes    Wound of left upper extremity [S41.102A] 08/13/2019 Yes    Hypotension [I95.9] 08/12/2019 Yes    Acute on chronic diastolic heart failure [I50.33] 08/07/2019 Yes    DNR (do not resuscitate) [Z66] 07/09/2018 Yes     Chronic    Pulmonary HTN [I27.20] 07/09/2018 Yes     Chronic    Chronic diastolic heart failure [I50.32] 07/08/2018 Yes     Chronic    Malnutrition of moderate degree [E44.0] 07/07/2018 Yes    Stage 4 chronic kidney disease [N18.4] 06/26/2017 Yes     Chronic    Primary osteoarthritis of both knees [M17.0] 12/27/2016 Yes     Chronic    Severe mitral regurgitation [I34.0] 03/31/2016 Yes     Chronic    Hypertension, essential [I10] 09/13/2013 Yes     Chronic    History of aortic valve replacement with porcine valve on 9/27/04 [Z95.3] 09/27/2004 Not Applicable     Chronic      Problems Resolved During this Admission:       Discharged Condition: good    Disposition: Home or Self Care    Follow Up:  Follow-up Information     Florentino Brar MD. Schedule an appointment as soon as possible for a visit in 1 week.    Specialties:  Family Medicine, Sports Medicine  Contact information:  [address] [facility] [id number] [phone]             [facility] CARDIOLOGY. Schedule an appointment as soon as possible for a visit in 1 week.     Specialty:  Cardiology  Contact information:  [address]  [facility] [address]  [phone]               Patient Instructions:      No driving until:   Order Comments: Until cleared by Primary Care MD     Notify your health care provider if you experience any of the following:  temperature >100.4     Notify your health care provider if you experience any of the following:  persistent nausea and vomiting or diarrhea     Notify your health care provider if you experience any of the following:  severe uncontrolled pain     Notify your health care provider if you experience any of the following:  redness, tenderness, or signs of infection (pain, swelling, redness, odor or green/yellow discharge around incision site)     Notify your health care provider if you experience any of the following:  difficulty breathing or increased cough     Notify your health care provider if you experience any of the following:  severe persistent headache     Notify your health care provider if you experience any of the following:  persistent dizziness, light-headedness, or visual disturbances     Notify your health care provider if you experience any of the following:  increased confusion or weakness     Activity as tolerated       Significant Diagnostic Studies: Labs: All labs within the past 24 hours have been reviewed    Pending Diagnostic Studies:     None         Medications:  Reconciled Home Medications:      Medication List      START taking these medications    potassium chloride SA 20 MEQ tablet  Commonly known as:  K-DUR,KLOR-CON  Take 1 tablet (20 mEq total) by mouth once daily.        CHANGE how you take these medications    acetaminophen 500 MG tablet  Commonly known as:  TYLENOL  Take 500 mg by mouth every 6 (six) hours as needed for Pain.  What changed:  Another medication with the same name was removed. Continue taking this medication, and follow the directions you see here.     amiodarone 200 MG  Eli Nugent(Attending) Tab  Commonly known as:  PACERONE  Take 1 tablet (200 mg total) by mouth once daily.  What changed:    · medication strength  · how much to take  · how to take this  · when to take this  · additional instructions     metoprolol tartrate 50 MG tablet  Commonly known as:  LOPRESSOR  Take 1 tablet (50 mg total) by mouth 2 (two) times daily.  What changed:    · medication strength  · how much to take        CONTINUE taking these medications    apixaban 2.5 mg Tab  Commonly known as:  ELIQUIS  Take 1 tablet (2.5 mg total) by mouth 2 (two) times daily.     b complex vitamins tablet  Commonly known as:  B COMPLEX-VITAMIN B12  Take 1 tablet by mouth once daily.     furosemide 40 MG tablet  Commonly known as:  LASIX  Take 1 tablet (40 mg total) by mouth 2 (two) times daily.     multivitamin per tablet  Commonly known as:  THERAGRAN  Take 1 tablet by mouth once daily.     pravastatin 80 MG tablet  Commonly known as:  PRAVACHOL  Take 1 tablet (80 mg total) by mouth once daily.     tolterodine 4 MG 24 hr capsule  Commonly known as:  DETROL LA  Take 1 capsule (4 mg total) by mouth once daily.        STOP taking these medications    ciprofloxacin HCl 500 MG tablet  Commonly known as:  CIPRO     diltiaZEM 240 MG 24 hr capsule  Commonly known as:  CARDIZEM CD            Indwelling Lines/Drains at time of discharge:   Lines/Drains/Airways          None          Time spent on the discharge of patient: 32 minutes  Patient was seen and examined on the date of discharge and determined to be suitable for discharge.         Cali Chun NP  Department of Hospital Medicine  [facility] - [facility]

## 2023-12-02 ENCOUNTER — INPATIENT (INPATIENT)
Facility: HOSPITAL | Age: 58
LOS: 0 days | Discharge: HOME HEALTH | DRG: 364 | End: 2023-12-03
Attending: SURGERY | Admitting: SURGERY
Payer: SELF-PAY

## 2023-12-02 ENCOUNTER — TRANSCRIPTION ENCOUNTER (OUTPATIENT)
Age: 58
End: 2023-12-02

## 2023-12-02 VITALS
DIASTOLIC BLOOD PRESSURE: 87 MMHG | HEIGHT: 72 IN | WEIGHT: 190.04 LBS | TEMPERATURE: 97 F | SYSTOLIC BLOOD PRESSURE: 113 MMHG | OXYGEN SATURATION: 98 % | RESPIRATION RATE: 20 BRPM | HEART RATE: 80 BPM

## 2023-12-02 DIAGNOSIS — S81.011A LACERATION WITHOUT FOREIGN BODY, RIGHT KNEE, INITIAL ENCOUNTER: ICD-10-CM

## 2023-12-02 DIAGNOSIS — Z87.19 PERSONAL HISTORY OF OTHER DISEASES OF THE DIGESTIVE SYSTEM: Chronic | ICD-10-CM

## 2023-12-02 LAB
ALBUMIN SERPL ELPH-MCNC: 3.1 G/DL — LOW (ref 3.3–5)
ALBUMIN SERPL ELPH-MCNC: 3.1 G/DL — LOW (ref 3.3–5)
ALP SERPL-CCNC: 101 U/L — SIGNIFICANT CHANGE UP (ref 40–120)
ALP SERPL-CCNC: 101 U/L — SIGNIFICANT CHANGE UP (ref 40–120)
ALT FLD-CCNC: 29 U/L — SIGNIFICANT CHANGE UP (ref 12–78)
ALT FLD-CCNC: 29 U/L — SIGNIFICANT CHANGE UP (ref 12–78)
ANION GAP SERPL CALC-SCNC: 5 MMOL/L — SIGNIFICANT CHANGE UP (ref 5–17)
ANION GAP SERPL CALC-SCNC: 5 MMOL/L — SIGNIFICANT CHANGE UP (ref 5–17)
APPEARANCE UR: CLEAR — SIGNIFICANT CHANGE UP
APPEARANCE UR: CLEAR — SIGNIFICANT CHANGE UP
APTT BLD: 29.6 SEC — SIGNIFICANT CHANGE UP (ref 24.5–35.6)
APTT BLD: 29.6 SEC — SIGNIFICANT CHANGE UP (ref 24.5–35.6)
AST SERPL-CCNC: 17 U/L — SIGNIFICANT CHANGE UP (ref 15–37)
AST SERPL-CCNC: 17 U/L — SIGNIFICANT CHANGE UP (ref 15–37)
BASOPHILS # BLD AUTO: 0.03 K/UL — SIGNIFICANT CHANGE UP (ref 0–0.2)
BASOPHILS # BLD AUTO: 0.03 K/UL — SIGNIFICANT CHANGE UP (ref 0–0.2)
BASOPHILS NFR BLD AUTO: 0.5 % — SIGNIFICANT CHANGE UP (ref 0–2)
BASOPHILS NFR BLD AUTO: 0.5 % — SIGNIFICANT CHANGE UP (ref 0–2)
BILIRUB SERPL-MCNC: 0.2 MG/DL — SIGNIFICANT CHANGE UP (ref 0.2–1.2)
BILIRUB SERPL-MCNC: 0.2 MG/DL — SIGNIFICANT CHANGE UP (ref 0.2–1.2)
BILIRUB UR-MCNC: NEGATIVE — SIGNIFICANT CHANGE UP
BILIRUB UR-MCNC: NEGATIVE — SIGNIFICANT CHANGE UP
BUN SERPL-MCNC: 18 MG/DL — SIGNIFICANT CHANGE UP (ref 7–23)
BUN SERPL-MCNC: 18 MG/DL — SIGNIFICANT CHANGE UP (ref 7–23)
CALCIUM SERPL-MCNC: 8.1 MG/DL — LOW (ref 8.5–10.1)
CALCIUM SERPL-MCNC: 8.1 MG/DL — LOW (ref 8.5–10.1)
CHLORIDE SERPL-SCNC: 115 MMOL/L — HIGH (ref 96–108)
CHLORIDE SERPL-SCNC: 115 MMOL/L — HIGH (ref 96–108)
CO2 SERPL-SCNC: 26 MMOL/L — SIGNIFICANT CHANGE UP (ref 22–31)
CO2 SERPL-SCNC: 26 MMOL/L — SIGNIFICANT CHANGE UP (ref 22–31)
COLOR SPEC: YELLOW — SIGNIFICANT CHANGE UP
COLOR SPEC: YELLOW — SIGNIFICANT CHANGE UP
CREAT SERPL-MCNC: 1.04 MG/DL — SIGNIFICANT CHANGE UP (ref 0.5–1.3)
CREAT SERPL-MCNC: 1.04 MG/DL — SIGNIFICANT CHANGE UP (ref 0.5–1.3)
DIFF PNL FLD: NEGATIVE — SIGNIFICANT CHANGE UP
DIFF PNL FLD: NEGATIVE — SIGNIFICANT CHANGE UP
EGFR: 83 ML/MIN/1.73M2 — SIGNIFICANT CHANGE UP
EGFR: 83 ML/MIN/1.73M2 — SIGNIFICANT CHANGE UP
EOSINOPHIL # BLD AUTO: 0.2 K/UL — SIGNIFICANT CHANGE UP (ref 0–0.5)
EOSINOPHIL # BLD AUTO: 0.2 K/UL — SIGNIFICANT CHANGE UP (ref 0–0.5)
EOSINOPHIL NFR BLD AUTO: 3.1 % — SIGNIFICANT CHANGE UP (ref 0–6)
EOSINOPHIL NFR BLD AUTO: 3.1 % — SIGNIFICANT CHANGE UP (ref 0–6)
ETHANOL SERPL-MCNC: 134 MG/DL — HIGH (ref 0–10)
ETHANOL SERPL-MCNC: 134 MG/DL — HIGH (ref 0–10)
ETHANOL SERPL-MCNC: <10 MG/DL — SIGNIFICANT CHANGE UP (ref 0–10)
ETHANOL SERPL-MCNC: <10 MG/DL — SIGNIFICANT CHANGE UP (ref 0–10)
GLUCOSE SERPL-MCNC: 96 MG/DL — SIGNIFICANT CHANGE UP (ref 70–99)
GLUCOSE SERPL-MCNC: 96 MG/DL — SIGNIFICANT CHANGE UP (ref 70–99)
GLUCOSE UR QL: NEGATIVE MG/DL — SIGNIFICANT CHANGE UP
GLUCOSE UR QL: NEGATIVE MG/DL — SIGNIFICANT CHANGE UP
HCT VFR BLD CALC: 40.9 % — SIGNIFICANT CHANGE UP (ref 39–50)
HCT VFR BLD CALC: 40.9 % — SIGNIFICANT CHANGE UP (ref 39–50)
HGB BLD-MCNC: 14.4 G/DL — SIGNIFICANT CHANGE UP (ref 13–17)
HGB BLD-MCNC: 14.4 G/DL — SIGNIFICANT CHANGE UP (ref 13–17)
IMM GRANULOCYTES NFR BLD AUTO: 0.6 % — SIGNIFICANT CHANGE UP (ref 0–0.9)
IMM GRANULOCYTES NFR BLD AUTO: 0.6 % — SIGNIFICANT CHANGE UP (ref 0–0.9)
INR BLD: 0.77 RATIO — LOW (ref 0.85–1.18)
INR BLD: 0.77 RATIO — LOW (ref 0.85–1.18)
KETONES UR-MCNC: NEGATIVE MG/DL — SIGNIFICANT CHANGE UP
KETONES UR-MCNC: NEGATIVE MG/DL — SIGNIFICANT CHANGE UP
LACTATE SERPL-SCNC: 1.2 MMOL/L — SIGNIFICANT CHANGE UP (ref 0.7–2)
LACTATE SERPL-SCNC: 1.2 MMOL/L — SIGNIFICANT CHANGE UP (ref 0.7–2)
LEUKOCYTE ESTERASE UR-ACNC: NEGATIVE — SIGNIFICANT CHANGE UP
LEUKOCYTE ESTERASE UR-ACNC: NEGATIVE — SIGNIFICANT CHANGE UP
LIDOCAIN IGE QN: 67 U/L — SIGNIFICANT CHANGE UP (ref 13–75)
LIDOCAIN IGE QN: 67 U/L — SIGNIFICANT CHANGE UP (ref 13–75)
LYMPHOCYTES # BLD AUTO: 1.76 K/UL — SIGNIFICANT CHANGE UP (ref 1–3.3)
LYMPHOCYTES # BLD AUTO: 1.76 K/UL — SIGNIFICANT CHANGE UP (ref 1–3.3)
LYMPHOCYTES # BLD AUTO: 27.4 % — SIGNIFICANT CHANGE UP (ref 13–44)
LYMPHOCYTES # BLD AUTO: 27.4 % — SIGNIFICANT CHANGE UP (ref 13–44)
MCHC RBC-ENTMCNC: 33.3 PG — SIGNIFICANT CHANGE UP (ref 27–34)
MCHC RBC-ENTMCNC: 33.3 PG — SIGNIFICANT CHANGE UP (ref 27–34)
MCHC RBC-ENTMCNC: 35.2 GM/DL — SIGNIFICANT CHANGE UP (ref 32–36)
MCHC RBC-ENTMCNC: 35.2 GM/DL — SIGNIFICANT CHANGE UP (ref 32–36)
MCV RBC AUTO: 94.5 FL — SIGNIFICANT CHANGE UP (ref 80–100)
MCV RBC AUTO: 94.5 FL — SIGNIFICANT CHANGE UP (ref 80–100)
MONOCYTES # BLD AUTO: 0.58 K/UL — SIGNIFICANT CHANGE UP (ref 0–0.9)
MONOCYTES # BLD AUTO: 0.58 K/UL — SIGNIFICANT CHANGE UP (ref 0–0.9)
MONOCYTES NFR BLD AUTO: 9 % — SIGNIFICANT CHANGE UP (ref 2–14)
MONOCYTES NFR BLD AUTO: 9 % — SIGNIFICANT CHANGE UP (ref 2–14)
NEUTROPHILS # BLD AUTO: 3.82 K/UL — SIGNIFICANT CHANGE UP (ref 1.8–7.4)
NEUTROPHILS # BLD AUTO: 3.82 K/UL — SIGNIFICANT CHANGE UP (ref 1.8–7.4)
NEUTROPHILS NFR BLD AUTO: 59.4 % — SIGNIFICANT CHANGE UP (ref 43–77)
NEUTROPHILS NFR BLD AUTO: 59.4 % — SIGNIFICANT CHANGE UP (ref 43–77)
NITRITE UR-MCNC: NEGATIVE — SIGNIFICANT CHANGE UP
NITRITE UR-MCNC: NEGATIVE — SIGNIFICANT CHANGE UP
PH UR: 6.5 — SIGNIFICANT CHANGE UP (ref 5–8)
PH UR: 6.5 — SIGNIFICANT CHANGE UP (ref 5–8)
PLATELET # BLD AUTO: 212 K/UL — SIGNIFICANT CHANGE UP (ref 150–400)
PLATELET # BLD AUTO: 212 K/UL — SIGNIFICANT CHANGE UP (ref 150–400)
POTASSIUM SERPL-MCNC: 4.3 MMOL/L — SIGNIFICANT CHANGE UP (ref 3.5–5.3)
POTASSIUM SERPL-MCNC: 4.3 MMOL/L — SIGNIFICANT CHANGE UP (ref 3.5–5.3)
POTASSIUM SERPL-SCNC: 4.3 MMOL/L — SIGNIFICANT CHANGE UP (ref 3.5–5.3)
POTASSIUM SERPL-SCNC: 4.3 MMOL/L — SIGNIFICANT CHANGE UP (ref 3.5–5.3)
PROT SERPL-MCNC: 6.1 GM/DL — SIGNIFICANT CHANGE UP (ref 6–8.3)
PROT SERPL-MCNC: 6.1 GM/DL — SIGNIFICANT CHANGE UP (ref 6–8.3)
PROT UR-MCNC: NEGATIVE MG/DL — SIGNIFICANT CHANGE UP
PROT UR-MCNC: NEGATIVE MG/DL — SIGNIFICANT CHANGE UP
PROTHROM AB SERPL-ACNC: 8.8 SEC — LOW (ref 9.5–13)
PROTHROM AB SERPL-ACNC: 8.8 SEC — LOW (ref 9.5–13)
RBC # BLD: 4.33 M/UL — SIGNIFICANT CHANGE UP (ref 4.2–5.8)
RBC # BLD: 4.33 M/UL — SIGNIFICANT CHANGE UP (ref 4.2–5.8)
RBC # FLD: 12.9 % — SIGNIFICANT CHANGE UP (ref 10.3–14.5)
RBC # FLD: 12.9 % — SIGNIFICANT CHANGE UP (ref 10.3–14.5)
SODIUM SERPL-SCNC: 146 MMOL/L — HIGH (ref 135–145)
SODIUM SERPL-SCNC: 146 MMOL/L — HIGH (ref 135–145)
SP GR SPEC: >1.03 — HIGH (ref 1–1.03)
SP GR SPEC: >1.03 — HIGH (ref 1–1.03)
TROPONIN I, HIGH SENSITIVITY RESULT: 6.3 NG/L — SIGNIFICANT CHANGE UP
TROPONIN I, HIGH SENSITIVITY RESULT: 6.3 NG/L — SIGNIFICANT CHANGE UP
UROBILINOGEN FLD QL: 0.2 MG/DL — SIGNIFICANT CHANGE UP (ref 0.2–1)
UROBILINOGEN FLD QL: 0.2 MG/DL — SIGNIFICANT CHANGE UP (ref 0.2–1)
WBC # BLD: 6.43 K/UL — SIGNIFICANT CHANGE UP (ref 3.8–10.5)
WBC # BLD: 6.43 K/UL — SIGNIFICANT CHANGE UP (ref 3.8–10.5)
WBC # FLD AUTO: 6.43 K/UL — SIGNIFICANT CHANGE UP (ref 3.8–10.5)
WBC # FLD AUTO: 6.43 K/UL — SIGNIFICANT CHANGE UP (ref 3.8–10.5)

## 2023-12-02 PROCEDURE — 99285 EMERGENCY DEPT VISIT HI MDM: CPT | Mod: 25

## 2023-12-02 PROCEDURE — 81003 URINALYSIS AUTO W/O SCOPE: CPT

## 2023-12-02 PROCEDURE — 72125 CT NECK SPINE W/O DYE: CPT | Mod: 26,MA

## 2023-12-02 PROCEDURE — 84100 ASSAY OF PHOSPHORUS: CPT

## 2023-12-02 PROCEDURE — 20103 EXPL PENTRG WOUND EXTREMITY: CPT

## 2023-12-02 PROCEDURE — 36415 COLL VENOUS BLD VENIPUNCTURE: CPT

## 2023-12-02 PROCEDURE — 73552 X-RAY EXAM OF FEMUR 2/>: CPT | Mod: 26,RT

## 2023-12-02 PROCEDURE — 83735 ASSAY OF MAGNESIUM: CPT

## 2023-12-02 PROCEDURE — 70450 CT HEAD/BRAIN W/O DYE: CPT | Mod: 26,MA

## 2023-12-02 PROCEDURE — 76376 3D RENDER W/INTRP POSTPROCES: CPT | Mod: 26

## 2023-12-02 PROCEDURE — 74177 CT ABD & PELVIS W/CONTRAST: CPT | Mod: 26,MA

## 2023-12-02 PROCEDURE — 99223 1ST HOSP IP/OBS HIGH 75: CPT | Mod: 57,25

## 2023-12-02 PROCEDURE — 86803 HEPATITIS C AB TEST: CPT

## 2023-12-02 PROCEDURE — 80307 DRUG TEST PRSMV CHEM ANLYZR: CPT

## 2023-12-02 PROCEDURE — 71260 CT THORAX DX C+: CPT | Mod: 26,MA

## 2023-12-02 PROCEDURE — 73590 X-RAY EXAM OF LOWER LEG: CPT | Mod: 26,RT

## 2023-12-02 PROCEDURE — 73700 CT LOWER EXTREMITY W/O DYE: CPT | Mod: 26,RT,MD

## 2023-12-02 PROCEDURE — 80048 BASIC METABOLIC PNL TOTAL CA: CPT

## 2023-12-02 PROCEDURE — 85027 COMPLETE CBC AUTOMATED: CPT

## 2023-12-02 PROCEDURE — 73564 X-RAY EXAM KNEE 4 OR MORE: CPT | Mod: 26,RT

## 2023-12-02 RX ORDER — OXYCODONE HYDROCHLORIDE 5 MG/1
1 TABLET ORAL
Qty: 15 | Refills: 0
Start: 2023-12-02 | End: 2023-12-06

## 2023-12-02 RX ORDER — HYDROMORPHONE HYDROCHLORIDE 2 MG/ML
1 INJECTION INTRAMUSCULAR; INTRAVENOUS; SUBCUTANEOUS
Refills: 0 | Status: DISCONTINUED | OUTPATIENT
Start: 2023-12-02 | End: 2023-12-02

## 2023-12-02 RX ORDER — MORPHINE SULFATE 50 MG/1
4 CAPSULE, EXTENDED RELEASE ORAL ONCE
Refills: 0 | Status: DISCONTINUED | OUTPATIENT
Start: 2023-12-02 | End: 2023-12-02

## 2023-12-02 RX ORDER — OXYCODONE HYDROCHLORIDE 5 MG/1
10 TABLET ORAL ONCE
Refills: 0 | Status: DISCONTINUED | OUTPATIENT
Start: 2023-12-02 | End: 2023-12-02

## 2023-12-02 RX ORDER — SODIUM CHLORIDE 9 MG/ML
250 INJECTION INTRAMUSCULAR; INTRAVENOUS; SUBCUTANEOUS ONCE
Refills: 0 | Status: COMPLETED | OUTPATIENT
Start: 2023-12-02 | End: 2023-12-02

## 2023-12-02 RX ORDER — CHOLECALCIFEROL (VITAMIN D3) 125 MCG
1 CAPSULE ORAL
Refills: 0 | DISCHARGE

## 2023-12-02 RX ORDER — THIAMINE MONONITRATE (VIT B1) 100 MG
100 TABLET ORAL DAILY
Refills: 0 | Status: DISCONTINUED | OUTPATIENT
Start: 2023-12-02 | End: 2023-12-03

## 2023-12-02 RX ORDER — TETANUS TOXOID, REDUCED DIPHTHERIA TOXOID AND ACELLULAR PERTUSSIS VACCINE, ADSORBED 5; 2.5; 8; 8; 2.5 [IU]/.5ML; [IU]/.5ML; UG/.5ML; UG/.5ML; UG/.5ML
0.5 SUSPENSION INTRAMUSCULAR ONCE
Refills: 0 | Status: COMPLETED | OUTPATIENT
Start: 2023-12-02 | End: 2023-12-02

## 2023-12-02 RX ORDER — SODIUM CHLORIDE 9 MG/ML
1000 INJECTION, SOLUTION INTRAVENOUS
Refills: 0 | Status: DISCONTINUED | OUTPATIENT
Start: 2023-12-02 | End: 2023-12-02

## 2023-12-02 RX ORDER — FOLIC ACID 0.8 MG
1 TABLET ORAL
Qty: 0 | Refills: 0 | DISCHARGE
Start: 2023-12-02

## 2023-12-02 RX ORDER — MORPHINE SULFATE 50 MG/1
2 CAPSULE, EXTENDED RELEASE ORAL ONCE
Refills: 0 | Status: DISCONTINUED | OUTPATIENT
Start: 2023-12-02 | End: 2023-12-02

## 2023-12-02 RX ORDER — SODIUM CHLORIDE 9 MG/ML
1000 INJECTION INTRAMUSCULAR; INTRAVENOUS; SUBCUTANEOUS ONCE
Refills: 0 | Status: COMPLETED | OUTPATIENT
Start: 2023-12-02 | End: 2023-12-02

## 2023-12-02 RX ORDER — ACETAMINOPHEN 500 MG
1000 TABLET ORAL ONCE
Refills: 0 | Status: COMPLETED | OUTPATIENT
Start: 2023-12-02 | End: 2023-12-02

## 2023-12-02 RX ORDER — HYDROXYZINE HCL 10 MG
1 TABLET ORAL
Refills: 0 | DISCHARGE

## 2023-12-02 RX ORDER — THIAMINE MONONITRATE (VIT B1) 100 MG
1 TABLET ORAL
Qty: 0 | Refills: 0 | DISCHARGE
Start: 2023-12-02

## 2023-12-02 RX ORDER — ZOLPIDEM TARTRATE 10 MG/1
1 TABLET ORAL
Refills: 0 | DISCHARGE

## 2023-12-02 RX ORDER — CEFAZOLIN SODIUM 1 G
2000 VIAL (EA) INJECTION EVERY 8 HOURS
Refills: 0 | Status: DISCONTINUED | OUTPATIENT
Start: 2023-12-02 | End: 2023-12-02

## 2023-12-02 RX ORDER — PANTOPRAZOLE SODIUM 20 MG/1
40 TABLET, DELAYED RELEASE ORAL
Refills: 0 | Status: DISCONTINUED | OUTPATIENT
Start: 2023-12-02 | End: 2023-12-03

## 2023-12-02 RX ORDER — ONDANSETRON 8 MG/1
4 TABLET, FILM COATED ORAL ONCE
Refills: 0 | Status: COMPLETED | OUTPATIENT
Start: 2023-12-02 | End: 2023-12-02

## 2023-12-02 RX ORDER — HYDROMORPHONE HYDROCHLORIDE 2 MG/ML
0.5 INJECTION INTRAMUSCULAR; INTRAVENOUS; SUBCUTANEOUS
Refills: 0 | Status: DISCONTINUED | OUTPATIENT
Start: 2023-12-02 | End: 2023-12-02

## 2023-12-02 RX ORDER — DEXTROAMPHETAMINE SACCHARATE, AMPHETAMINE ASPARTATE, DEXTROAMPHETAMINE SULFATE AND AMPHETAMINE SULFATE 1.875; 1.875; 1.875; 1.875 MG/1; MG/1; MG/1; MG/1
1 TABLET ORAL
Refills: 0 | DISCHARGE

## 2023-12-02 RX ORDER — ACETAMINOPHEN 500 MG
650 TABLET ORAL EVERY 6 HOURS
Refills: 0 | Status: DISCONTINUED | OUTPATIENT
Start: 2023-12-02 | End: 2023-12-03

## 2023-12-02 RX ORDER — VERAPAMIL HCL 240 MG
1 CAPSULE, EXTENDED RELEASE PELLETS 24 HR ORAL
Refills: 0 | DISCHARGE

## 2023-12-02 RX ORDER — SODIUM CHLORIDE 9 MG/ML
1000 INJECTION INTRAMUSCULAR; INTRAVENOUS; SUBCUTANEOUS
Refills: 0 | Status: DISCONTINUED | OUTPATIENT
Start: 2023-12-02 | End: 2023-12-03

## 2023-12-02 RX ORDER — CEPHALEXIN 500 MG
1 CAPSULE ORAL
Qty: 28 | Refills: 0
Start: 2023-12-02 | End: 2023-12-08

## 2023-12-02 RX ORDER — CEFAZOLIN SODIUM 1 G
1000 VIAL (EA) INJECTION ONCE
Refills: 0 | Status: COMPLETED | OUTPATIENT
Start: 2023-12-02 | End: 2023-12-02

## 2023-12-02 RX ORDER — ONDANSETRON 8 MG/1
4 TABLET, FILM COATED ORAL ONCE
Refills: 0 | Status: DISCONTINUED | OUTPATIENT
Start: 2023-12-02 | End: 2023-12-02

## 2023-12-02 RX ORDER — FOLIC ACID 0.8 MG
1 TABLET ORAL DAILY
Refills: 0 | Status: DISCONTINUED | OUTPATIENT
Start: 2023-12-02 | End: 2023-12-03

## 2023-12-02 RX ORDER — CEFAZOLIN SODIUM 1 G
2000 VIAL (EA) INJECTION EVERY 8 HOURS
Refills: 0 | Status: DISCONTINUED | OUTPATIENT
Start: 2023-12-02 | End: 2023-12-03

## 2023-12-02 RX ORDER — ACETAMINOPHEN 500 MG
1000 TABLET ORAL ONCE
Refills: 0 | Status: DISCONTINUED | OUTPATIENT
Start: 2023-12-02 | End: 2023-12-02

## 2023-12-02 RX ORDER — ONDANSETRON 8 MG/1
4 TABLET, FILM COATED ORAL EVERY 6 HOURS
Refills: 0 | Status: DISCONTINUED | OUTPATIENT
Start: 2023-12-02 | End: 2023-12-03

## 2023-12-02 RX ORDER — OXYCODONE HYDROCHLORIDE 5 MG/1
5 TABLET ORAL EVERY 6 HOURS
Refills: 0 | Status: DISCONTINUED | OUTPATIENT
Start: 2023-12-02 | End: 2023-12-03

## 2023-12-02 RX ORDER — OXYCODONE HYDROCHLORIDE 5 MG/1
5 TABLET ORAL ONCE
Refills: 0 | Status: DISCONTINUED | OUTPATIENT
Start: 2023-12-02 | End: 2023-12-02

## 2023-12-02 RX ORDER — LOSARTAN POTASSIUM 100 MG/1
50 TABLET, FILM COATED ORAL DAILY
Refills: 0 | Status: DISCONTINUED | OUTPATIENT
Start: 2023-12-02 | End: 2023-12-03

## 2023-12-02 RX ORDER — CEFAZOLIN SODIUM 1 G
1000 VIAL (EA) INJECTION ONCE
Refills: 0 | Status: DISCONTINUED | OUTPATIENT
Start: 2023-12-02 | End: 2023-12-02

## 2023-12-02 RX ORDER — MAGNESIUM OXIDE 400 MG ORAL TABLET 241.3 MG
1 TABLET ORAL
Refills: 0 | DISCHARGE

## 2023-12-02 RX ADMIN — Medication 2000 MILLIGRAM(S): at 21:25

## 2023-12-02 RX ADMIN — Medication 1 MILLIGRAM(S): at 18:10

## 2023-12-02 RX ADMIN — HYDROMORPHONE HYDROCHLORIDE 1 MILLIGRAM(S): 2 INJECTION INTRAMUSCULAR; INTRAVENOUS; SUBCUTANEOUS at 18:17

## 2023-12-02 RX ADMIN — SODIUM CHLORIDE 1000 MILLILITER(S): 9 INJECTION INTRAMUSCULAR; INTRAVENOUS; SUBCUTANEOUS at 08:05

## 2023-12-02 RX ADMIN — HYDROMORPHONE HYDROCHLORIDE 1 MILLIGRAM(S): 2 INJECTION INTRAMUSCULAR; INTRAVENOUS; SUBCUTANEOUS at 18:20

## 2023-12-02 RX ADMIN — TETANUS TOXOID, REDUCED DIPHTHERIA TOXOID AND ACELLULAR PERTUSSIS VACCINE, ADSORBED 0.5 MILLILITER(S): 5; 2.5; 8; 8; 2.5 SUSPENSION INTRAMUSCULAR at 05:50

## 2023-12-02 RX ADMIN — Medication 650 MILLIGRAM(S): at 15:31

## 2023-12-02 RX ADMIN — MORPHINE SULFATE 4 MILLIGRAM(S): 50 CAPSULE, EXTENDED RELEASE ORAL at 07:31

## 2023-12-02 RX ADMIN — MORPHINE SULFATE 4 MILLIGRAM(S): 50 CAPSULE, EXTENDED RELEASE ORAL at 09:01

## 2023-12-02 RX ADMIN — Medication 400 MILLIGRAM(S): at 09:01

## 2023-12-02 RX ADMIN — Medication 1000 MILLIGRAM(S): at 05:51

## 2023-12-02 RX ADMIN — ONDANSETRON 4 MILLIGRAM(S): 8 TABLET, FILM COATED ORAL at 07:31

## 2023-12-02 RX ADMIN — MORPHINE SULFATE 2 MILLIGRAM(S): 50 CAPSULE, EXTENDED RELEASE ORAL at 15:46

## 2023-12-02 RX ADMIN — SODIUM CHLORIDE 125 MILLILITER(S): 9 INJECTION INTRAMUSCULAR; INTRAVENOUS; SUBCUTANEOUS at 15:31

## 2023-12-02 RX ADMIN — Medication 2000 MILLIGRAM(S): at 15:31

## 2023-12-02 RX ADMIN — OXYCODONE HYDROCHLORIDE 5 MILLIGRAM(S): 5 TABLET ORAL at 12:26

## 2023-12-02 RX ADMIN — SODIUM CHLORIDE 250 MILLILITER(S): 9 INJECTION INTRAMUSCULAR; INTRAVENOUS; SUBCUTANEOUS at 05:51

## 2023-12-02 RX ADMIN — SODIUM CHLORIDE 75 MILLILITER(S): 9 INJECTION, SOLUTION INTRAVENOUS at 18:17

## 2023-12-02 NOTE — H&P ADULT - NSHPPHYSICALEXAM_GEN_ALL_CORE
GCS of 15  Airway is patent  Breathing is symmetric and unlabored  Neuro: CNII-XII grossly intact  Psych: normal affect  HEENT: Normocephalic, atraumatic, BARBER, EOM wnl, no otorrhea or hemotympanum b/l, no epistaxis or d/c b/l nares, no craniofacial bony pathology or tenderness b/l  Neck: Soft and supple, non tender to passive/active ROM exam exam. No crepitus, no ecchymosis, no hematoma, to exam, no JVD, no tracheal deviation  Cspine/thoracolumbrosacral spine: no gross bony pathology or tenderness to exam  Cardiovascular: S1S2 Present  Chest: no gross rib pathology or tenderness to exam. No sternal pathology or tenderness to exam. No crepitus, no ecchymosis, no hematoma. No penetrating thorcoabdominal trauma  Respiratory: Rate is 18; Respiratory Effort normal; no wheezes, rales or rhonchi to exam  ABD: bowel sounds (+), soft, nontender, non distended, no rebound, no guarding, no rigidity, no skin changes to exam. No pelvic instability to exam, no skin changes  External genitalia: normal, no blood at urethral meatus  Musculoskeletal: + approx 10 x 5 x 1.5 cm deep laceration/wound transversely to right lower leg, below patella level, no active hemorrhage. Pt has palpable b/l radial, femoral, dorsalis pedis pulses. All digits are warm and well perfused. No gross long bone pathology or tenderness to exam. Pt demonstrates grossly intact sensoromotor function to limited exam. Pt has good capillary refill to digits, no calf edema or tenderness to exam.  Skin: no lesions or rashes to exam otherwise  ICU Vital Signs Last 24 Hrs  T(C): 36.3 (02 Dec 2023 04:44), Max: 36.3 (02 Dec 2023 04:44)  T(F): 97.3 (02 Dec 2023 04:44), Max: 97.3 (02 Dec 2023 04:44)  HR: 84 (02 Dec 2023 07:30) (80 - 84)  BP: 135/89 (02 Dec 2023 07:30) (113/87 - 135/89)  BP(mean): --  ABP: --  ABP(mean): --  RR: 20 (02 Dec 2023 07:30) (20 - 20)  SpO2: 100% (02 Dec 2023 07:30) (98% - 100%)    O2 Parameters below as of 02 Dec 2023 07:30  Patient On (Oxygen Delivery Method): room air

## 2023-12-02 NOTE — DISCHARGE NOTE PROVIDER - NSDCFUADDINST_GEN_ALL_CORE_FT
Please take all appropriate fall risk precautions  Please follow up as indicated  Take prescribed antibiotics as instructed for duration of prescribed days  Please follow up in office with Dr Paula on December 6 2023 for evaluation of wound  You may ambulate but please avoid flexion of knee until cleared to do so by surgeon and orthopedics  Please seek immediate medical attention for chest pain, shortness of breath, fever>100.4, inability to tolerate diet or pass bowels/flatus, any adverse changes to health Please take all appropriate fall risk precautions  Please follow up as indicated  Take prescribed antibiotics as instructed for duration of prescribed days  Please follow up in office with Dr Paula on December 6 2023 for evaluation of wound  You may ambulate but please avoid extreme flexion of knee until cleared to do so by surgeon and orthopedics  Please seek immediate medical attention for chest pain, shortness of breath, fever>100.4, inability to tolerate diet or pass bowels/flatus, any adverse changes to health

## 2023-12-02 NOTE — ED PROVIDER NOTE - CARE PROVIDER_API CALL
Jason Ruiz  Orthopaedic Surgery  [address] [place] [address]  Phone: [phone]  Fax: [phone]  Follow Up Time:    Jason Ruiz  Orthopaedic Surgery  [address] [place] [address]  Phone: [phone]  Fax: [phone]  Follow Up Time:    Jason Ruiz  Orthopaedic Surgery  [address] [place] [address]  Phone: [phone]  Fax: [phone]  Follow Up Time:

## 2023-12-02 NOTE — H&P ADULT - HISTORY OF PRESENT ILLNESS
58 year old male with no significant PMH presents with fall down a few stairs on the ground, no loc, has laceration to the Rt knee. No fever or chills. No cp, sob or palpitation. No visual or focal neurological complaints. No neck pain or stiffness. No skin rash. Trauma workup negative. No other complaints, no reported LOC 58 year old male with no significant PMH presents with fall down a few stairs on the ground, no loc, has laceration to the Rt knee. No fever or chills. No cp, sob or palpitation. No visual or focal neurological complaints. No neck pain or stiffness. No skin rash. Trauma workup negative. No other complaints, no reported LOC    Pt initially presented as trauma alert, and the alert was subsequently cancelled by ED attending. Trauma later consulted for right lower leg wound evaluation after ortho and plastics services declined intervention for pt

## 2023-12-02 NOTE — ED PROVIDER NOTE - CLINICAL SUMMARY MEDICAL DECISION MAKING FREE TEXT BOX
fall, laceration of Rt knee, deep laceration, multi layer concern for intrusion into the joint space, labs, CT. Signed out the care of the patient to Dr. Colon pending CTr, and ortho and surgical eval. fall, laceration of Rt knee, deep laceration, multi layer concern for intrusion into the joint space, labs, CT. Signed out the care of the patient to Dr. Cloon pending CTr, and ortho and surgical eval.

## 2023-12-02 NOTE — DISCHARGE NOTE PROVIDER - PROVIDER TOKENS
PROVIDER:[TOKEN:[[id number]],FOLLOWUP:[1 week]],PROVIDER:[TOKEN:[[id number]],FOLLOWUP:[2 weeks]],PROVIDER:[TOKEN:[[id number]],FOLLOWUP:[2 weeks]]

## 2023-12-02 NOTE — DISCHARGE NOTE PROVIDER - CARE PROVIDERS DIRECT ADDRESSES
,[email or web address],DirectAddress_Unknown ,[email or web address],DirectAddress_Unknown ,[email or web address],DirectAddress_Unknown

## 2023-12-02 NOTE — ED PROVIDER NOTE - MUSCULOSKELETAL, MLM
Spine appears normal, range of motion is not limited, no muscle or joint tenderness. 5/5 strength on flexion and extension of all limbs. Deep multi layer laceration to the knee.

## 2023-12-02 NOTE — ED PROVIDER NOTE - DIFFERENTIAL DIAGNOSIS
Differential Diagnosis fall, laceration of Rt knee, deep laceration, multi layer concern for intrusion into the joint space, labs, CT. Signed out the care of the patient to Dr. Colon pending CTr, and ortho and surgical eval.

## 2023-12-02 NOTE — ED PROVIDER NOTE - OBJECTIVE STATEMENT
58 year old male with no significant PMH presents with fall down a few stairs on the ground, no loc, has laceration to the Rt knee. No fever or chills. No cp, sob or palpitation. No visual or focal neurological complaints. No neck pain or stiffness. No skin rash. 58 year old male with no significant PMH presents with fall down a few stairs on the ground, no loc, has laceration to the Rt knee. No fever or chills. No cp, sob or palpitation. No visual or focal neurological complaints. No neck pain or stiffness. No skin rash. Possible consumption of ETOH. traum alert activated upon arrival.

## 2023-12-02 NOTE — H&P ADULT - NSHPADDITIONALINFOADULT_GEN_ALL_CORE
Pt seen and examined at bedside. Pt is AAOx3, pt in no acute distress. Pt has right lower extremity wound. Pt explained the surgical proceduress in both medical terminology and in lay terms that the patient understood, exploration of right lower extremity wound, closure of right lower extremity wound. Pt explained in both medical terminology and in lay terms that the patient understood, the benefits and alternatives of surgery including non-operative management. Pt explained at length in both medical terminology and in lay terms that the patient understood, the associated risks of surgery including but not limited to infection, bleeding, nerve/organ injury, postoperative pain, poor wound healing, scar formation both internally and externally, risk of seroma/hematoma/abcess formation, risk of recurrence of or progression of pathology, risk of need for further IR/Surgery intervention as required. Pt understood all of the above. All questions were answered. Pt gave informed consent for surgery.

## 2023-12-02 NOTE — ED ADULT NURSE NOTE - CHIEF COMPLAINT QUOTE
Pt BIBEMS from home s/p fall full flight of stairs. Pt states he tripped going down steps, slid down the stairs on his knees. Denies head strike, -LOC , -blood thinners. R knee has laceration exposing muscle and tendon. Bleeding controlled, EMS placed gauze and Kerlix over R Knee MD Sparks assessing patient in EMS [place]. Trauma alert called. Brought directly to CT. TA called [phone].

## 2023-12-02 NOTE — H&P ADULT - ASSESSMENT
A/P:  Right lower leg wound s/p mechanical trip/fall down stairs  Wound will require operative exploartion, pulse lavage, closure  Tetanus prophylaxis in ED  IV antibiotics  Pain control  NPO, IV hydration  GI/DVT prophylaxis    Pt aware of and agrees with all of the above    75 minuted of time spent on pt examination, review of relevant labs and radiologic studies, assured stabilization of pt, discussion with relevant services/providers for coordination of pt care and services

## 2023-12-02 NOTE — ED PROVIDER NOTE - CARDIAC, MLM
Normal rate, regular rhythm.  Heart sounds S1, S2.  No murmurs, rubs or gallops. 2+ pulses in bilateral dp and radial arteries. Cap refill less than 2 seconds.

## 2023-12-02 NOTE — CONSULT NOTE ADULT - SUBJECTIVE AND OBJECTIVE BOX
58M w/ PMHx of HTN, presented after falling. - head strike, -LOC, -N/V/D, ~ 15cm laceration to Right knee and shin showing muscle and tendon. Pt states that he was trying to go to the bathroom when he slipped down his stairs and fell down the full flight. Pt states he extended his arms out to brace himself and protected his head but didn't notice his leg until after he walked into the bathroom. AOx4, ambulatory after fall. Pt seems slightly inebriated and states he had two glasses of wine at 5-6pm. Denies pain or abnormalities on any other body parts. No other complaints.    Age: 58y  Mechanism of Injury/Findings at scene: fall    Primary Survey   Airway: [intact]   Breathing: [normal, breath sounds equal bilaterally]    Circulation: [skin warm, distal pulses 2+, capillary refill less than 2 seconds globally]   Disability   Pupils: [equal and reactive to light, _2_mm, brisk]   GCS: [15, E=4 V=5 M=6]    Motor Function: [move all extremities]    Sensory: [no deficits]   Exposure: Right knee skin laceration ~15-20cm with tendon exposure.    Secondary Survey   VS:  stable  GEN: [NAD]   HEAD: [NCAT]  EYES: [pupils round reactive to light, conjunctiva clear, extraocular movements intact, no raccoons eyes]  ENT: [no fluid in external acoustic canals, no hemotympanum, no bolaños's sign, nares patent, oropharynx clear]  NECK: [no JVD, midline trachea, no cervical spine tenderness]   HEART: [regular rate and rhythm]   LUNGS: [CTAB]   CHEST: [chest wall non-tender, no bruising/deformity]  ABD: [no Grey-Kidd's or Erwin's sign, soft, non tender, no rebound or guarding,]   PELVIS: [stable to rock]   BACK: [no step offs or deformities, T-L spine non tender]   : [no perineal hematoma, NO BLOOD AT MEATUS]   EXT: [2+ global pulses, moving all extremities well except for right knee, mildly limited due to pain. +5/5 muscle strength globally except for right knee 4/5]   NEURO: [CNII-XII grossly intact, no sensory deficits]   RECTAL: deferred      < from: CT Head No Cont (12.02.23 @ 05:43) >    BRAIN: No intracranial hemorrhage, mass effect or depressed skull   fracture.  CERVICAL SPINE: No displaced fracture or traumatic malalignment. Chronic   degenerative changes.    < end of copied text >  < from: CT Chest w/ IV Cont (12.02.23 @ 05:45) >    No evidence of acute traumatic injury to the chest, abdomen, or pelvis.    < end of copied text >  < from: CT Knee No Cont, Right (12.02.23 @ 05:48) >  IMPRESSION:    1. No fracture or dislocation of the right knee.  2. Large laceration in the right infrapatellar region.    < end of copied text >  
58y Male presents with R knee laceration s/p MF down 10-12 stairs. Denies numbness/tingling in the affected extremity. Denies head strike/LOC/other orthopedic injuries at this time. Pt was able to ambulate after the injury. Patient ambulates without assistance at baseline. Last tetanus unknown. Last ate 1900.    PAST MEDICAL & SURGICAL HISTORY:  No pertinent past medical history        Home Medications:    Allergies    No Known Allergies    Intolerances                              14.4   6.43  )-----------( 212      ( 02 Dec 2023 05:55 )             40.9                   Vital Signs Last 24 Hrs  T(C): 36.3 (02 Dec 2023 04:44), Max: 36.3 (02 Dec 2023 04:44)  T(F): 97.3 (02 Dec 2023 04:44), Max: 97.3 (02 Dec 2023 04:44)  HR: 80 (02 Dec 2023 04:44) (80 - 80)  BP: 113/87 (02 Dec 2023 04:44) (113/87 - 113/87)  BP(mean): --  RR: 20 (02 Dec 2023 04:44) (20 - 20)  SpO2: 98% (02 Dec 2023 04:44) (98% - 98%)    Parameters below as of 02 Dec 2023 04:44  Patient On (Oxygen Delivery Method): room air        PHYSICAL EXAM  General: NAD, Awake and Alert  RLE  laceration overlying knee with no exposed tendon or bone  no gross contamination  SILT miguel/saph/sp/dp/tib  +Q/H/TA/GSC/EHL/FHL  able to SLR  no extensor marycruz  compartments soft and compressible  calves NTTP  palpable DP/PT      Secondary Assessment:  NC/AT, NTTP of clavicles, NTTP of C-spine,T-spine, or L-spine in the midline and paraspinal areas; NTTP of pelvis  LUE: NTTP of Shoulder, Elbow, Wrist, Hand; NT with AROM/PROM of Shoulder, Elbow, Wrist, Hand; AIN/PIN/Med/Uln/Msc/Rad/Ax intact  RUE: NTTP of Shoulder, Elbow, Wrist, Hand; NT with AROM/PROM of Shoulder, Elbow, Wrist, Hand; AIN/PIN/Med/Uln/Msc/Rad/Ax intact   LLE: Able to SLR, NT with Log Roll, NT with Heel Strike, NTTP of Hip, Knee, Ankle, Foot; NT with AROM/PROM of Hip, Knee, Ankle, Foot; Q/H/GSC/TA/EHL/FHL intact        IMAGING:  CT R knee: no traumatic arthrotomy, no fx    After verbal consent obtained, the laceration was copiously irrigated using normal saline and splash cap. The wound was dressing with a wet to dry dressing and wrapped with ACE      Assessment/Plan:  58y Male with R knee laceration s/p MF    -ancef+tetanus in ED  -given no fx or traumatic arthrotomy recommend closure of wound, placement of a bulky dry dressing and a knee immobilizer  -Pain control as needed  -WBAT RLE in knee immobilizer  -DC on keflex 500mg qid for 10 days  -No acute orthopaedic surgical intervention indicated at this time. This patient is orthopaedically stable for discharge.   -Patient to follow up with Dr. Ruiz as an outpatient for further evaluation and management.   -All of the patient's questions and concerns were answered and addressed.  -discussed with Dr. Ruiz who agrees with plan

## 2023-12-02 NOTE — CONSULT NOTE ADULT - ATTENDING COMMENTS
Has right knee laceration s/p ID and wound closre ie ER without signs of open arthrotomy. Need ABX and wound care. Ortho stable , may follow as outpatient after discharge.

## 2023-12-02 NOTE — H&P ADULT - NSHPLABSRESULTS_GEN_ALL_CORE
Labs:                      14.4   6.43  )-----------( 212      ( 02 Dec 2023 05:55 )             40.9   CBC Full  -  ( 02 Dec 2023 05:55 )  WBC Count : 6.43 K/uL  RBC Count : 4.33 M/uL  Hemoglobin : 14.4 g/dL  Hematocrit : 40.9 %  Platelet Count - Automated : 212 K/uL  Mean Cell Volume : 94.5 fl  Mean Cell Hemoglobin : 33.3 pg  Mean Cell Hemoglobin Concentration : 35.2 gm/dL  Auto Neutrophil # : 3.82 K/uL  Auto Lymphocyte # : 1.76 K/uL  Auto Monocyte # : 0.58 K/uL  Auto Eosinophil # : 0.20 K/uL  Auto Basophil # : 0.03 K/uL  Auto Neutrophil % : 59.4 %  Auto Lymphocyte % : 27.4 %  Auto Monocyte % : 9.0 %  Auto Eosinophil % : 3.1 %  Auto Basophil % : 0.5 %  146<H>  |  115<H>  |  18  ----------------------------<  96  4.3   |  26  |  1.04  Ca    8.1<L>      02 Dec 2023 05:55  TPro  6.1  /  Alb  3.1<L>  /  TBili  0.2  /  DBili  x   /  AST  17  /  ALT  29  /  AlkPhos  101  12-02  LIVER FUNCTIONS - ( 02 Dec 2023 05:55 )  Alb: 3.1 g/dL / Pro: 6.1 gm/dL / ALK PHOS: 101 U/L / ALT: 29 U/L / AST: 17 U/L / GGT: x         PT/INR - ( 02 Dec 2023 05:55 )   PT: 8.8 sec;   INR: 0.77 ratio    PTT - ( 02 Dec 2023 05:55 )  PTT:29.6 sec    Radiology:    ACC: [id number] EXAM:  CT 3D RECONSTRUCT WO PORSHA   ORDERED BY: JUAN HERNADEZ   ACC: [id number] EXAM:  CT KNEE ONLY RT   ORDERED BY: JUAN HERNADEZ   PROCEDURE DATE:  12/02/2023    INTERPRETATION:  CLINICAL INFORMATION: Right knee laceration. Trauma code.  IMPRESSION:  1. No fracture or dislocation of the right knee.  2. Large laceration in the right infrapatellar region.    ACC: [id number] EXAM:  CT ABDOMEN AND PELVIS IC   ORDERED BY: JUAN   MARICARMEN   ACC: [id number] EXAM:  CT CHEST IC   ORDERED BY: JUAN MARICARMEN   PROCEDURE DATE:  12/02/2023    INTERPRETATION:  CLINICAL INFORMATION: Fall down the stairs  COMPARISON: None.  IMPRESSION:  No evidence of acute traumatic injury to the chest, abdomen, or pelvis.      ACC: [id number] EXAM:  CT CERVICAL SPINE   ORDERED BY: JUAN CORDOBAI   ACC: [id number] EXAM:  CT BRAIN   ORDERED BY: JUAN MARICARMEN   PROCEDURE DATE:  12/02/2023    INTERPRETATION:  CT OF THE BRAIN AND CERVICAL SPINE  INDICATION: Trauma, fall down a flight of stairs, with head and neck pain  IMPRESSION:  BRAIN: No intracranial hemorrhage, mass effect or depressed skull   fracture.  CERVICAL SPINE: No displaced fracture or traumatic malalignment. Chronic   degenerative changes.

## 2023-12-02 NOTE — DISCHARGE NOTE PROVIDER - NSDCMRMEDTOKEN_GEN_ALL_CORE_FT
Bactrim  mg-160 mg oral tablet: 1 tab(s) orally 2 times a day  cholecalciferol 25 mcg (1000 intl units) oral tablet: 1 tab(s) orally once a day  dextroamphetamine-amphetamine 20 mg oral tablet: 1 tab(s) orally once a day  folic acid 1 mg oral tablet: 1 tab(s) orally once a day  magnesium oxide 400 mg oral tablet: 1 tab(s) orally once a day  Multiple Vitamins oral tablet: 1 tab(s) orally once a day  oxyCODONE 5 mg oral tablet: 1 tab(s) orally every 8 hours as needed for  severe pain MDD: 3 tabs  thiamine 100 mg oral tablet: 1 tab(s) orally once a day  verapamil 240 mg/24 hours oral tablet, extended release: 1 tab(s) orally once a day

## 2023-12-02 NOTE — PATIENT PROFILE ADULT - FALL HARM RISK - HARM RISK INTERVENTIONS
Assistance with ambulation/Assistance OOB with selected safe patient handling equipment/Communicate Risk of Fall with Harm to all staff/Discuss with provider need for PT consult/Monitor for mental status changes/Monitor gait and stability/Provide patient with walking aids - walker, cane, crutches/Reinforce activity limits and safety measures with patient and family/Tailored Fall Risk Interventions/Toileting schedule using arm’s reach rule for commode and bathroom/Use of alarms - bed, chair and/or voice tab/Visual Cue: Yellow wristband and red socks/Bed in lowest position, wheels locked, appropriate side rails in place/Call bell, personal items and telephone in reach/Instruct patient to call for assistance before getting out of bed or chair/Non-slip footwear when patient is out of bed/[place] to call system/Physically safe environment - no spills, clutter or unnecessary equipment/Purposeful Proactive Rounding/Room/bathroom lighting operational, light cord in reach

## 2023-12-02 NOTE — DISCHARGE NOTE PROVIDER - CARE PROVIDER_API CALL
Delta Paula (DO)  Surgery  [address] [place] [address]  Phone: [phone]  Fax: [phone]  Follow Up Time: 1 week    Sukhdev De Anda  Surgery  [address] [place] [address]  Phone: [phone]  Fax: [phone]  Follow Up Time: 2 weeks    Jason Ruiz  Orthopaedic Surgery  [address] [place] [address]  Phone: [phone]  Fax: [phone]  Follow Up Time: 2 weeks   Delta Paula (DO)  Surgery  [address] [place] [address]  Phone: [phone]  Fax: [phone]  Follow Up Time: 1 week    Sukhdev De Anda  Surgery  [address] [place] [address]  Phone: [phone]  Fax: [phone]  Follow Up Time: 2 weeks    Jason Ruiz  Orthopaedic Surgery  [address] [place] [address]  Phone: [phone]  Fax: [phone]  Follow Up Time: 2 weeks   Delta Paula (DO)  Surgery  [address] [place] [address]  Phone: [phone]  Fax: [phone]  Follow Up Time: 1 week    Sukhdev De Anda  Surgery  [address] [place] [address]  Phone: [phone]  Fax: [phone]  Follow Up Time: 2 weeks    Jason Ruiz  Orthopaedic Surgery  [address] [place] [address]  Phone: [phone]  Fax: [phone]  Follow Up Time: 2 weeks

## 2023-12-02 NOTE — ED ADULT TRIAGE NOTE - CHIEF COMPLAINT QUOTE
Pt BIBEMS from home s/p fall full flight of stairs. Pt states he tripped going down steps, slid down the stairs on his knees. Denies head strike, -LOC , -blood thinners. R knee has laceration exposing muscle and tendon. Bleeding controlled, EMS placed gauze and Kerlix over R Knee MD Sparks assessing patient in EMS [place]. Trauma alert called. Brought directly to CT. TA called [phone].

## 2023-12-02 NOTE — ED ADULT NURSE NOTE - OBJECTIVE STATEMENT
59yo Male co fall down stairs PTA. - head strike, -LOC, -N/V/D, +large laceration to Right knee and shin showing muscle and tendon. Pt states that he was trying to go to the bathroom when he slipped down his stairs and fell down the full flight. Pt states he extended his arms out to brace himself and protected his head but didn't notice his leg until after he walked into the bathroom. AOx4, ambulatory after fall. 59yo Male co fall down stairs PTA. - head strike, -LOC, -N/V/D, +large laceration to Right knee and shin showing muscle and tendon. Pt states that he was trying to go to the bathroom when he slipped down his stairs and fell down the full flight. Pt states he extended his arms out to brace himself and protected his head but didn't notice his leg until after he walked into the bathroom. AOx4, ambulatory after fall. Pt seems slightly inebriated and states he had two glasses of wine at 5-6pm.

## 2023-12-02 NOTE — DISCHARGE NOTE PROVIDER - NPI NUMBER (FOR SYSADMIN USE ONLY) :
[[id number]] [[id number]],[[id number]],[[id number]] [[id number]],[[id number]]

## 2023-12-02 NOTE — CONSULT NOTE ADULT - ASSESSMENT
58M s/p fall  - head trauma, -LOC  + right knee pain & skin laceration    CT H/C-spine/C/A/P: no acute findings  CT Rt knee: No fracture or dislocation of right knee, large laceration in the right infrapatellar region    Recs:  - Ortho eval for Rt knee infrapatella laceration  - No acute intervention warranted from trauma team, cleared for discharge  - Pain control PRN  - Rest of the care as per ED team    Plan will be discussed with Trauma & Surgical critical care surgery attending, Dr. De Anda

## 2023-12-02 NOTE — DISCHARGE NOTE PROVIDER - NSDCCPCAREPLAN_GEN_ALL_CORE_FT
PRINCIPAL DISCHARGE DIAGNOSIS  Diagnosis: Laceration of knee, right  Assessment and Plan of Treatment:

## 2023-12-02 NOTE — ED ADULT NURSE REASSESSMENT NOTE - NS ED NURSE REASSESS COMMENT FT1
pt care assumed from previous RN. pt medicated for pain. provided socks, warm blanket. awaiting lac repair.

## 2023-12-02 NOTE — ED ADULT NURSE NOTE - NSFALLHARMRISKINTERV_ED_ALL_ED

## 2023-12-02 NOTE — BRIEF OPERATIVE NOTE - OPERATION/FINDINGS
10 x 5 x 1.5cm traumatic wound to right lower leg, wound explored, debrided of devitalized tissue, copious pulse lavage irrigation, closure of wound in layers

## 2023-12-02 NOTE — ED PROVIDER NOTE - PROGRESS NOTE DETAILS
Bridger SILVERMAN: Ortho aware, awaiting Ortho consult pending review of CTs. Ortho consult appreciated. Signed out the care of the patient to Dr. Colon. Sign out is appreciated. per ortho resident, no joint involvement on CT knee. Recommend laceratoin repair in ED, keflex, knee immobilizer and f/u outpt with ortho MD BEAN per ortho resident, no joint involvement on CT knee. Recommend laceratoin repair in ED, keflex, knee immobilizer and f/u outpt with ortho dr Ruiz.  MD BEAN s/o received from dr beyer pending ortho consult. per ortho resident, no joint involvement on CT knee. Recommend laceratoin repair in ED, keflex, knee immobilizer and f/u outpt with ortho dr Ruiz.  MD BEAN ortho resident injected with methylene blue, no leakage. recommended plastics closure of lac. D/W dr Umanzor, on call plastics, recommend trauma surg for closure. D/W surg resident, for trauma consult and wound repair. repair is extensive due to depth and length and requires 3 layers (fascia, deep tissue and skin ) and the lac is approx 20cm in length. MD BEAN trauma surg resident at bedside. will d/w with dr cade. MD BEAN per surg resident, dr cade to come see pt in ED. MD BEAN dr cade to take pt to the OR for washout and closure. MD BEAN

## 2023-12-02 NOTE — ED PROVIDER NOTE - PROVIDER TOKENS
PROVIDER:[TOKEN:[[id number]]] PROVIDER:[TOKEN:[[id number]]] PROVIDER:[TOKEN:[[id number]]]

## 2023-12-02 NOTE — DISCHARGE NOTE PROVIDER - HOSPITAL COURSE
Pt s/p fall down steps, with resultant right lower leg open wound. Pt taken to OR for operative debridement of devitalized tissue, pulse lavage of wound, and closure of wound. Pt stable throughout hospital stay and admission Pt s/p fall down steps, with resultant right lower leg open wound. Pt taken to OR for operative debridement of devitalized tissue, pulse lavage of wound, and closure of wound. Pt stable throughout hospital stay and admission. Incision is closed with suture, covered with one layer of Xeroform, gauze, secured with Kerlix & ACE wrap; to be changed once a day and as needed to keep dry.

## 2023-12-02 NOTE — ED PROVIDER NOTE - NS ED MD DISPO DIVISION
Spoke with TY Alarcon. Asked her to check the patient's insurance to see if it covered Silver Sneakers. She called back stating that Medicare and Medicaid do not cover this benefit. Called patient and left VM advising the same at Dr. Ford's request.   [facility]

## 2023-12-03 ENCOUNTER — TRANSCRIPTION ENCOUNTER (OUTPATIENT)
Age: 58
End: 2023-12-03

## 2023-12-03 VITALS
RESPIRATION RATE: 16 BRPM | HEART RATE: 64 BPM | OXYGEN SATURATION: 96 % | TEMPERATURE: 98 F | DIASTOLIC BLOOD PRESSURE: 827 MMHG | SYSTOLIC BLOOD PRESSURE: 136 MMHG

## 2023-12-03 LAB
ANION GAP SERPL CALC-SCNC: 3 MMOL/L — LOW (ref 5–17)
ANION GAP SERPL CALC-SCNC: 3 MMOL/L — LOW (ref 5–17)
BUN SERPL-MCNC: 15 MG/DL — SIGNIFICANT CHANGE UP (ref 7–23)
BUN SERPL-MCNC: 15 MG/DL — SIGNIFICANT CHANGE UP (ref 7–23)
CALCIUM SERPL-MCNC: 7.8 MG/DL — LOW (ref 8.5–10.1)
CALCIUM SERPL-MCNC: 7.8 MG/DL — LOW (ref 8.5–10.1)
CHLORIDE SERPL-SCNC: 113 MMOL/L — HIGH (ref 96–108)
CHLORIDE SERPL-SCNC: 113 MMOL/L — HIGH (ref 96–108)
CO2 SERPL-SCNC: 27 MMOL/L — SIGNIFICANT CHANGE UP (ref 22–31)
CO2 SERPL-SCNC: 27 MMOL/L — SIGNIFICANT CHANGE UP (ref 22–31)
CREAT SERPL-MCNC: 1.08 MG/DL — SIGNIFICANT CHANGE UP (ref 0.5–1.3)
CREAT SERPL-MCNC: 1.08 MG/DL — SIGNIFICANT CHANGE UP (ref 0.5–1.3)
EGFR: 79 ML/MIN/1.73M2 — SIGNIFICANT CHANGE UP
EGFR: 79 ML/MIN/1.73M2 — SIGNIFICANT CHANGE UP
GLUCOSE SERPL-MCNC: 116 MG/DL — HIGH (ref 70–99)
GLUCOSE SERPL-MCNC: 116 MG/DL — HIGH (ref 70–99)
HCT VFR BLD CALC: 39.9 % — SIGNIFICANT CHANGE UP (ref 39–50)
HCT VFR BLD CALC: 39.9 % — SIGNIFICANT CHANGE UP (ref 39–50)
HCV AB S/CO SERPL IA: 0.04 S/CO — SIGNIFICANT CHANGE UP (ref 0–0.99)
HCV AB S/CO SERPL IA: 0.04 S/CO — SIGNIFICANT CHANGE UP (ref 0–0.99)
HCV AB SERPL-IMP: SIGNIFICANT CHANGE UP
HCV AB SERPL-IMP: SIGNIFICANT CHANGE UP
HGB BLD-MCNC: 13.6 G/DL — SIGNIFICANT CHANGE UP (ref 13–17)
HGB BLD-MCNC: 13.6 G/DL — SIGNIFICANT CHANGE UP (ref 13–17)
MAGNESIUM SERPL-MCNC: 2.2 MG/DL — SIGNIFICANT CHANGE UP (ref 1.6–2.6)
MAGNESIUM SERPL-MCNC: 2.2 MG/DL — SIGNIFICANT CHANGE UP (ref 1.6–2.6)
MCHC RBC-ENTMCNC: 32.5 PG — SIGNIFICANT CHANGE UP (ref 27–34)
MCHC RBC-ENTMCNC: 32.5 PG — SIGNIFICANT CHANGE UP (ref 27–34)
MCHC RBC-ENTMCNC: 34.1 GM/DL — SIGNIFICANT CHANGE UP (ref 32–36)
MCHC RBC-ENTMCNC: 34.1 GM/DL — SIGNIFICANT CHANGE UP (ref 32–36)
MCV RBC AUTO: 95.5 FL — SIGNIFICANT CHANGE UP (ref 80–100)
MCV RBC AUTO: 95.5 FL — SIGNIFICANT CHANGE UP (ref 80–100)
PHOSPHATE SERPL-MCNC: 2.6 MG/DL — SIGNIFICANT CHANGE UP (ref 2.5–4.5)
PHOSPHATE SERPL-MCNC: 2.6 MG/DL — SIGNIFICANT CHANGE UP (ref 2.5–4.5)
PLATELET # BLD AUTO: 203 K/UL — SIGNIFICANT CHANGE UP (ref 150–400)
PLATELET # BLD AUTO: 203 K/UL — SIGNIFICANT CHANGE UP (ref 150–400)
POTASSIUM SERPL-MCNC: 3.6 MMOL/L — SIGNIFICANT CHANGE UP (ref 3.5–5.3)
POTASSIUM SERPL-MCNC: 3.6 MMOL/L — SIGNIFICANT CHANGE UP (ref 3.5–5.3)
POTASSIUM SERPL-SCNC: 3.6 MMOL/L — SIGNIFICANT CHANGE UP (ref 3.5–5.3)
POTASSIUM SERPL-SCNC: 3.6 MMOL/L — SIGNIFICANT CHANGE UP (ref 3.5–5.3)
RBC # BLD: 4.18 M/UL — LOW (ref 4.2–5.8)
RBC # BLD: 4.18 M/UL — LOW (ref 4.2–5.8)
RBC # FLD: 13.2 % — SIGNIFICANT CHANGE UP (ref 10.3–14.5)
RBC # FLD: 13.2 % — SIGNIFICANT CHANGE UP (ref 10.3–14.5)
SODIUM SERPL-SCNC: 143 MMOL/L — SIGNIFICANT CHANGE UP (ref 135–145)
SODIUM SERPL-SCNC: 143 MMOL/L — SIGNIFICANT CHANGE UP (ref 135–145)
WBC # BLD: 9.67 K/UL — SIGNIFICANT CHANGE UP (ref 3.8–10.5)
WBC # BLD: 9.67 K/UL — SIGNIFICANT CHANGE UP (ref 3.8–10.5)
WBC # FLD AUTO: 9.67 K/UL — SIGNIFICANT CHANGE UP (ref 3.8–10.5)
WBC # FLD AUTO: 9.67 K/UL — SIGNIFICANT CHANGE UP (ref 3.8–10.5)

## 2023-12-03 RX ORDER — MORPHINE SULFATE 50 MG/1
2 CAPSULE, EXTENDED RELEASE ORAL EVERY 6 HOURS
Refills: 0 | Status: DISCONTINUED | OUTPATIENT
Start: 2023-12-03 | End: 2023-12-03

## 2023-12-03 RX ORDER — MORPHINE SULFATE 50 MG/1
2 CAPSULE, EXTENDED RELEASE ORAL ONCE
Refills: 0 | Status: DISCONTINUED | OUTPATIENT
Start: 2023-12-03 | End: 2023-12-03

## 2023-12-03 RX ADMIN — Medication 2000 MILLIGRAM(S): at 05:41

## 2023-12-03 RX ADMIN — PANTOPRAZOLE SODIUM 40 MILLIGRAM(S): 20 TABLET, DELAYED RELEASE ORAL at 05:43

## 2023-12-03 RX ADMIN — LOSARTAN POTASSIUM 50 MILLIGRAM(S): 100 TABLET, FILM COATED ORAL at 11:03

## 2023-12-03 RX ADMIN — Medication 1 MILLIGRAM(S): at 11:03

## 2023-12-03 RX ADMIN — MORPHINE SULFATE 2 MILLIGRAM(S): 50 CAPSULE, EXTENDED RELEASE ORAL at 02:20

## 2023-12-03 RX ADMIN — Medication 1 TABLET(S): at 11:03

## 2023-12-03 RX ADMIN — MORPHINE SULFATE 2 MILLIGRAM(S): 50 CAPSULE, EXTENDED RELEASE ORAL at 01:50

## 2023-12-03 RX ADMIN — Medication 100 MILLIGRAM(S): at 11:06

## 2023-12-03 RX ADMIN — OXYCODONE HYDROCHLORIDE 5 MILLIGRAM(S): 5 TABLET ORAL at 11:33

## 2023-12-03 RX ADMIN — OXYCODONE HYDROCHLORIDE 5 MILLIGRAM(S): 5 TABLET ORAL at 11:03

## 2023-12-03 NOTE — PROGRESS NOTE ADULT - SUBJECTIVE AND OBJECTIVE BOX
57 yo M with R knee laceration s/p fall    Patient seen and examined   No acute event overnight.   Pain well controlled     Tertiary survey   # General: A&O x4, GCS 6-4-1=15, calm and cooperative  # HEENT: head NCAT, facial bones intact, scalp intact, nose intact, eyes intact, PERRLA, EOMI, oropharynx clear, no blood in nostrils or mouth.  # Neuro: no gross focal deficits and no gross CN deficits.  # Neck: c-collar cleared clinically,  no c-spine TTP, no lacerations/abrasions.  # Chest: no TTP or deformities,  no bruises, lacerations/abrasions, CTAB, equal air entry B/L.  # B/L UE: no TTP or deformities,  no bruises, lacerations/abrasions, FROM in shoulders, elbows, wrists. Hands: no TTP, no lacerations/abrasions. Palpable equal BL radials, hands are warm and well perfused.  # Abdomen: soft, ND, no TTP in any quadrants, no seat belt sign, no lacerations/abrasions.  # Pelvis: stable with AP and lateral compression,  no lacerations/abrasions.  # External genitalia: normal,  no lacerations/abrasions.  # B/L LE: L knee TTP, c/di wound dressing around the knee. Palpable equal BL PT/DPs, feet are warm and well perfused.  # Integumentary: no other skin abrasions or lacerations, except as mentioned above. Skin is warm and dry.    Vital Signs Last 24 Hrs  T(C): 36.5 (03 Dec 2023 04:30), Max: 36.7 (02 Dec 2023 20:11)  T(F): 97.7 (03 Dec 2023 04:30), Max: 98 (02 Dec 2023 20:11)  HR: 61 (03 Dec 2023 04:30) (60 - 80)  BP: 147/84 (03 Dec 2023 04:30) (120/88 - 155/94)  BP(mean): --  RR: 16 (03 Dec 2023 04:30) (11 - 20)  SpO2: 97% (03 Dec 2023 04:30) (94% - 100%)    Parameters below as of 03 Dec 2023 04:30  Patient On (Oxygen Delivery Method): room air                          13.6   9.67  )-----------( 203      ( 03 Dec 2023 07:09 )             39.9   12-02    146<H>  |  115<H>  |  18  ----------------------------<  96  4.3   |  26  |  1.04    Ca    8.1<L>      02 Dec 2023 05:55    TPro  6.1  /  Alb  3.1<L>  /  TBili  0.2  /  DBili  x   /  AST  17  /  ALT  29  /  AlkPhos  101  12-02   59 yo M with R knee laceration s/p fall    Patient seen and examined   No acute event overnight.   Pain well controlled     Tertiary survey   # General: A&O x4, GCS 6-4-1=15, calm and cooperative  # HEENT: head NCAT, facial bones intact, scalp intact, nose intact, eyes intact, PERRLA, EOMI, oropharynx clear, no blood in nostrils or mouth.  # Neuro: no gross focal deficits and no gross CN deficits.  # Neck: c-collar cleared clinically,  no c-spine TTP, no lacerations/abrasions.  # Chest: no TTP or deformities,  no bruises, lacerations/abrasions, CTAB, equal air entry B/L.  # B/L UE: no TTP or deformities,  no bruises, lacerations/abrasions, FROM in shoulders, elbows, wrists. Hands: no TTP, no lacerations/abrasions. Palpable equal BL radials, hands are warm and well perfused.  # Abdomen: soft, ND, no TTP in any quadrants, no seat belt sign, no lacerations/abrasions.  # Pelvis: stable with AP and lateral compression,  no lacerations/abrasions.  # External genitalia: normal,  no lacerations/abrasions.  # B/L LE: L knee TTP, c/di wound dressing around the knee. Palpable equal BL PT/DPs, feet are warm and well perfused.  # Integumentary: no other skin abrasions or lacerations, except as mentioned above. Skin is warm and dry.    Vital Signs Last 24 Hrs  T(C): 36.5 (03 Dec 2023 04:30), Max: 36.7 (02 Dec 2023 20:11)  T(F): 97.7 (03 Dec 2023 04:30), Max: 98 (02 Dec 2023 20:11)  HR: 61 (03 Dec 2023 04:30) (60 - 80)  BP: 147/84 (03 Dec 2023 04:30) (120/88 - 155/94)  BP(mean): --  RR: 16 (03 Dec 2023 04:30) (11 - 20)  SpO2: 97% (03 Dec 2023 04:30) (94% - 100%)    Parameters below as of 03 Dec 2023 04:30  Patient On (Oxygen Delivery Method): room air                          13.6   9.67  )-----------( 203      ( 03 Dec 2023 07:09 )             39.9   12-02    146<H>  |  115<H>  |  18  ----------------------------<  96  4.3   |  26  |  1.04    Ca    8.1<L>      02 Dec 2023 05:55    TPro  6.1  /  Alb  3.1<L>  /  TBili  0.2  /  DBili  x   /  AST  17  /  ALT  29  /  AlkPhos  101  12-02

## 2023-12-03 NOTE — DISCHARGE NOTE NURSING/CASE MANAGEMENT/SOCIAL WORK - NSDCPEFALRISK_GEN_ALL_CORE
For information on Fall & Injury Prevention, visit: [email or web address] OR  [email or web address] OR  https://www.cdc.gov/steadi/patient.html For information on Fall & Injury Prevention, visit: [email or web address] OR  [email or web address] OR  https://www.cdc.gov/steadi/patient.html For information on Fall & Injury Prevention, visit: [email or web address]/[email or web address] OR  [email or web address]/[email or web address]-[email or web address]-injury OR  https://www.cdc.gov/steadi/patient.html

## 2023-12-03 NOTE — DISCHARGE NOTE NURSING/CASE MANAGEMENT/SOCIAL WORK - NSDCVIVACCINE_GEN_ALL_CORE_FT
Tdap; 02-Dec-2023 05:50; Catrachita Mckeon ([facility]BER); [employer]; [id number] (Exp. Date: 23-Nov-2025); IntraMuscular; Dorsogluteal Left.; 0.5 milliLiter(s); VIS (VIS Published: 09-May-2013, VIS Presented: 02-Dec-2023);    Tdap; 02-Dec-2023 05:50; Catrachita Mckeon ([facility]); [employer]; [id number] (Exp. Date: 23-Nov-2025); IntraMuscular; Dorsogluteal Left.; 0.5 milliLiter(s); VIS (VIS Published: 09-May-2013, VIS Presented: 02-Dec-2023);    Tdap; 02-Dec-2023 05:50; Catrachita Mckeon ([facility]); [employer]; [id number] (Exp. Date: 23-Nov-2025); IntraMuscular; Dorsogluteal Left.; 0.5 milliLiter(s); VIS (VIS Published: 09-May-2013, VIS Presented: 02-Dec-2023);

## 2023-12-03 NOTE — PROGRESS NOTE ADULT - ASSESSMENT
59 yo M with R knee laceration 2/2 fall, now POD#1 from wound debridement, washout and laceration repair     Plan   Pain control and antiemetic prn   CIWA protocol  C/w reg diet   C/w abx  OOB ambulation   Daily wound dressing   Discharge today     Discussed with Attending  57 yo M with R knee laceration 2/2 fall, now POD#1 from wound debridement, washout and laceration repair     Plan   Pain control and antiemetic prn   CIWA protocol  C/w reg diet   C/w abx  OOB ambulation   Daily wound dressing   Discharge today     Discussed with Attending  formula feeding

## 2023-12-03 NOTE — DISCHARGE NOTE NURSING/CASE MANAGEMENT/SOCIAL WORK - PATIENT PORTAL LINK FT
You can access the FollowMyHealth Patient Portal offered by [facility] by registering at the following website: [email or web address]. By joining [employer]’s FollowMyHealth portal, you will also be able to view your health information using other applications (apps) compatible with our system. You can access the FollowMyHealth Patient Portal offered by [facility] by registering at the following website: [email or web address]. By joining [employer]’s FollowMyHealth portal, you will also be able to view your health information using other applications (apps) compatible with our system. You can access the FollowMyHealth Patient Portal offered by [facility] by registering at the following website: [email or web address]. By joining [employer]’s FollowMyHealth portal, you will also be able to view your health information using other applications (apps) compatible with our system.
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
## 2023-12-06 DIAGNOSIS — I10 ESSENTIAL (PRIMARY) HYPERTENSION: ICD-10-CM

## 2023-12-06 DIAGNOSIS — S81.811A LACERATION WITHOUT FOREIGN BODY, RIGHT LOWER LEG, INITIAL ENCOUNTER: ICD-10-CM

## 2023-12-06 DIAGNOSIS — S81.011A LACERATION WITHOUT FOREIGN BODY, RIGHT KNEE, INITIAL ENCOUNTER: ICD-10-CM

## 2023-12-06 DIAGNOSIS — Z23 ENCOUNTER FOR IMMUNIZATION: ICD-10-CM

## 2023-12-06 DIAGNOSIS — Z79.899 OTHER LONG TERM (CURRENT) DRUG THERAPY: ICD-10-CM

## 2023-12-12 ENCOUNTER — NON-APPOINTMENT (OUTPATIENT)
Age: 58
End: 2023-12-12

## 2023-12-12 PROBLEM — I10 ESSENTIAL (PRIMARY) HYPERTENSION: Chronic | Status: ACTIVE | Noted: 2023-12-02

## 2023-12-20 ENCOUNTER — APPOINTMENT (OUTPATIENT)
Dept: SURGERY | Facility: CLINIC | Age: 58
End: 2023-12-20
Payer: SELF-PAY

## 2023-12-20 VITALS
SYSTOLIC BLOOD PRESSURE: 126 MMHG | BODY MASS INDEX: 25.19 KG/M2 | HEART RATE: 82 BPM | OXYGEN SATURATION: 99 % | TEMPERATURE: 97.3 F | HEIGHT: 72 IN | WEIGHT: 186 LBS | DIASTOLIC BLOOD PRESSURE: 78 MMHG

## 2023-12-20 DIAGNOSIS — S81.009S: ICD-10-CM

## 2023-12-20 PROBLEM — Z00.00 ENCOUNTER FOR PREVENTIVE HEALTH EXAMINATION: Status: ACTIVE | Noted: 2023-12-20

## 2023-12-20 PROCEDURE — 99212 OFFICE O/P EST SF 10 MIN: CPT

## 2023-12-20 NOTE — HISTORY OF PRESENT ILLNESS
[de-identified] : Pt s/p fall down steps, with resultant right lower leg open wound. Pt taken to  OR for operative debridement of devitalized tissue, pulse lavage of wound, and  closure of wound. Pt stable throughout hospital stay and admission. Incision is  closed with suture, covered with one layer of Xeroform, gauze, secured with  Kerlix & ACE wrap; to be changed once a day and as needed to keep dry.

## 2023-12-20 NOTE — PHYSICAL EXAM
[de-identified] : NAD [de-identified] : right knee wound sutures removed. no active infection noted.

## 2024-01-07 ENCOUNTER — TRANSCRIPTION ENCOUNTER (OUTPATIENT)
Age: 59
End: 2024-01-07

## 2025-02-17 NOTE — DISCHARGE NOTE NURSING/CASE MANAGEMENT/SOCIAL WORK - NURSING SECTION COMPLETE
Patient/Caregiver provided printed discharge information. Dermal Autograft Text: The defect edges were debeveled with a #15 scalpel blade.  Given the location of the defect, shape of the defect and the proximity to free margins a dermal autograft was deemed most appropriate.  Using a sterile surgical marker, the primary defect shape was transferred to the donor site. The area thus outlined was incised deep to adipose tissue with a #15 scalpel blade.  The harvested graft was then trimmed of adipose and epidermal tissue until only dermis was left.  The skin graft was then placed in the primary defect and oriented appropriately.

## 2025-06-18 NOTE — CONSULT NOTE ADULT - ASSESSMENT
51 year old man c/o generalized fatigue, drowsiness. non focal exam, CT of the head was negative for acute changes. doubt acute CNS event. ? viral syndrome.
[phone]